# Patient Record
Sex: FEMALE | Race: BLACK OR AFRICAN AMERICAN | Employment: FULL TIME | URBAN - METROPOLITAN AREA
[De-identification: names, ages, dates, MRNs, and addresses within clinical notes are randomized per-mention and may not be internally consistent; named-entity substitution may affect disease eponyms.]

---

## 2017-03-15 LAB
CHLAMYDIA, EXTERNAL: NEGATIVE
HBSAG, EXTERNAL: NEGATIVE
HIV, EXTERNAL: NEGATIVE
N. GONORRHEA, EXTERNAL: NEGATIVE
RPR, EXTERNAL: NON REACTIVE
RUBELLA, EXTERNAL: NORMAL
TYPE, ABO & RH, EXTERNAL: NORMAL

## 2017-09-01 LAB — GRBS, EXTERNAL: POSITIVE

## 2017-09-20 ENCOUNTER — HOSPITAL ENCOUNTER (INPATIENT)
Age: 27
LOS: 2 days | Discharge: HOME | DRG: 560 | End: 2017-09-23
Attending: OBSTETRICS & GYNECOLOGY | Admitting: OBSTETRICS & GYNECOLOGY
Payer: MEDICAID

## 2017-09-20 PROCEDURE — 4A1H74Z MONITORING OF PRODUCTS OF CONCEPTION, CARDIAC ELECTRICAL ACTIVITY, VIA NATURAL OR ARTIFICIAL OPENING: ICD-10-PCS | Performed by: OBSTETRICS & GYNECOLOGY

## 2017-09-20 NOTE — IP AVS SNAPSHOT
[address] [address] 
[phone] Patient: Vern Chaudhari MRN: [id number] IHX:8/3/7302 Current Discharge Medication List  
  
START taking these medications Dose & Instructions Dispensing Information Comments Morning Noon Evening Bedtime  
 ibuprofen 800 mg tablet Commonly known as:  MOTRIN Your last dose was: Your next dose is:    
   
   
 Dose:  800 mg Take 1 Tab by mouth every eight (8) hours as needed. Quantity:  60 Tab Refills:  1  
     
   
   
   
  
 oxyCODONE-acetaminophen 5-325 mg per tablet Commonly known as:  PERCOCET Your last dose was: Your next dose is:    
   
   
 Dose:  2 Tab Take 2 Tabs by mouth every four (4) hours as needed. Max Daily Amount: 12 Tabs. Quantity:  30 Tab Refills:  0 ASK your doctor about these medications Dose & Instructions Dispensing Information Comments Morning Noon Evening Bedtime PRENATAL DHA+COMPLETE PRENATAL -300 mg-mcg-mg Cmpk Generic drug:  [id number] loyd-folic-dha Your last dose was: Your next dose is:    
   
   
 Dose:  1 Tab Take 1 Tab by mouth daily. Refills:  0 Where to Get Your Medications Information on where to get these meds will be given to you by the nurse or doctor. ! Ask your nurse or doctor about these medications  
  ibuprofen 800 mg tablet  
 oxyCODONE-acetaminophen 5-325 mg per tablet

## 2017-09-20 NOTE — IP AVS SNAPSHOT
Sarah Garcia 
 
 
 [address] [facility] [address] 
[phone] Patient: Vern Chaudhari MRN: [id number] PPE:3/8/5636 You are allergic to the following No active allergies Recent Documentation Height Weight Breastfeeding? BMI OB Status Smoking Status 1.6 m 112.9 kg Yes 44.11 kg/m2 Recent pregnancy Never Smoker Emergency Contacts Name Discharge Info Relation Home Work Mobile [address] [facility] CAREGIVER [3] Parent [1]   [phone] About your hospitalization You were admitted on:  September 21, 2017 You last received care in the:  [address] [place] [address] You were discharged on:  September 23, 2017 Unit phone number:  [phone] Why you were hospitalized Your primary diagnosis was:  Not on File Your diagnoses also included:  Iup (Intrauterine Pregnancy), Incidental, 39 Weeks Gestation Of Pregnancy, Normal Spontaneous Vaginal Delivery, Laceration Of Vaginal Wall Or Sulcus Without Perineal Laceration During Delivery Providers Seen During Your Hospitalizations Provider Role Specialty Primary office phone Lisa Castillo MD Attending Provider Obstetrics & Gynecology [phone] Your Primary Care Physician (PCP) Primary Care Physician Office Phone Office Fax [facility], [address] [phone] Follow-up Information Follow up With Details Comments Contact Info Christianne Borja, [address] [place] [address] [facility] [address] 
[phone] Current Discharge Medication List  
  
START taking these medications Dose & Instructions Dispensing Information Comments Morning Noon Evening Bedtime  
 ibuprofen 800 mg tablet Commonly known as:  MOTRIN Your last dose was: Your next dose is:    
   
   
 Dose:  800 mg Take 1 Tab by mouth every eight (8) hours as needed. Quantity:  60 Tab Refills:  1  
     
   
   
   
  
 oxyCODONE-acetaminophen 5-325 mg per tablet Commonly known as:  PERCOCET Your last dose was: Your next dose is:    
   
   
 Dose:  2 Tab Take 2 Tabs by mouth every four (4) hours as needed. Max Daily Amount: 12 Tabs. Quantity:  30 Tab Refills:  0 ASK your doctor about these medications Dose & Instructions Dispensing Information Comments Morning Noon Evening Bedtime PRENATAL DHA+COMPLETE PRENATAL -300 mg-mcg-mg Cmpk Generic drug:  [id number] loyd-folic-dha Your last dose was: Your next dose is:    
   
   
 Dose:  1 Tab Take 1 Tab by mouth daily. Refills:  0 Where to Get Your Medications Information on where to get these meds will be given to you by the nurse or doctor. ! Ask your nurse or doctor about these medications  
  ibuprofen 800 mg tablet  
 oxyCODONE-acetaminophen 5-325 mg per tablet Discharge Instructions [employer] Activation Thank you for requesting access to [employer]. Please follow the instructions below to securely access and download your online medical record. [employer]Happy allows you to send messages to your doctor, view your test results, renew your prescriptions, schedule appointments, and more. How Do I Sign Up? 1. In your internet browser, go to [email or web address] Night 
2. Click on the First Time User? Click Here link in the Sign In box. You will be redirect to the New Member Sign Up page. 3. Enter your BuyHappy Access Code exactly as it appears below. You will not need to use this code after youve completed the sign-up process. If you do not sign up before the expiration date, you must request a new code. BuyHappy Access Code: [email or web address] Expires: 2017  6:03 PM (This is the date your BuyHappy access code will ) 4.  Enter the last four digits of your Social Security Number (xxxx) and Date of Birth (mm/dd/yyyy) as indicated and click Submit. You will be taken to the next sign-up page. 5. Create a Best Doctors ID. This will be your Best Doctors login ID and cannot be changed, so think of one that is secure and easy to remember. 6. Create a Best Doctors password. You can change your password at any time. 7. Enter your Password Reset Question and Answer. This can be used at a later time if you forget your password. 8. Enter your e-mail address. You will receive e-mail notification when new information is available in [address] 9. Click Sign Up. You can now view and download portions of your medical record. 10. Click the Download Summary menu link to download a portable copy of your medical information. Additional Information If you have questions, please visit the Frequently Asked Questions section of the Best Doctors website at [email or web address] Remember, Best Doctors is NOT to be used for urgent needs. For medical emergencies, dial 911. Patient armband removed and shredded Stroke treatment brochure was provided to: patient. Rationale for acute work-up of symptoms explained. Possible treatments, such as tPA or intervention for ischemic strokes and the need for a quick work-up, have been reviewed. Recognize signs and symptoms of STROKE: 
 
F-face looks uneven A-arms unable to move or move unevenly S-speech slurred or non-existent T-time-call 911 as soon as signs and symptoms begin-DO NOT go Back to bed or wait to see if you get better-TIME IS BRAIN. Signed By: Omayra Rosenbaum RN                                                                                                   Date: 9/23/2017 Time: 6:03 PM 
 
 
 
Discharge Orders None [facility] Announcement We are excited to announce that we are making your provider's discharge notes available to you in Best Doctors.   You will see these notes when they are completed and signed by the physician that discharged you from your recent hospital stay. If you have any questions or concerns about any information you see in Divided, please call the Health Information Department where you were seen or reach out to your Primary Care Provider for more information about your plan of care. Introducing [facility]! Meredith Carlos introduces Divided patient portal. Now you can access parts of your medical record, email your doctor's office, and request medication refills online. 1. In your internet browser, go to [email or web address] 2. Click on the First Time User? Click Here link in the Sign In box. You will see the New Member Sign Up page. 3. Enter your Divided Access Code exactly as it appears below. You will not need to use this code after youve completed the sign-up process. If you do not sign up before the expiration date, you must request a new code. · Divided Access Code: [email or web address] Expires: 12/22/2017  6:03 PM 
 
4. Enter the last four digits of your Social Security Number (xxxx) and Date of Birth (mm/dd/yyyy) as indicated and click Submit. You will be taken to the next sign-up page. 5. Create a Divided ID. This will be your Divided login ID and cannot be changed, so think of one that is secure and easy to remember. 6. Create a Divided password. You can change your password at any time. 7. Enter your Password Reset Question and Answer. This can be used at a later time if you forget your password. 8. Enter your e-mail address. You will receive e-mail notification when new information is available in [address] 9. Click Sign Up. You can now view and download portions of your medical record. 10. Click the Download Summary menu link to download a portable copy of your medical information.  
 
If you have questions, please visit the Frequently Asked Questions section of the [employer]. Remember, MyChart is NOT to be used for urgent needs. For medical emergencies, dial 911. Now available from your iPhone and Android! General Information Please provide this summary of care documentation to your next provider. Patient Signature:  ____________________________________________________________ Date:  ____________________________________________________________  
  
Sj Matsu Provider Signature:  ____________________________________________________________ Date:  ____________________________________________________________

## 2017-09-21 ENCOUNTER — ANESTHESIA EVENT (OUTPATIENT)
Dept: LABOR AND DELIVERY | Age: 27
DRG: 560 | End: 2017-09-21
Payer: MEDICAID

## 2017-09-21 ENCOUNTER — ANESTHESIA (OUTPATIENT)
Dept: LABOR AND DELIVERY | Age: 27
DRG: 560 | End: 2017-09-21
Payer: MEDICAID

## 2017-09-21 PROBLEM — Z33.1 IUP (INTRAUTERINE PREGNANCY), INCIDENTAL: Status: RESOLVED | Noted: 2017-09-21 | Resolved: 2017-09-21

## 2017-09-21 PROBLEM — Z33.1 IUP (INTRAUTERINE PREGNANCY), INCIDENTAL: Status: ACTIVE | Noted: 2017-09-21

## 2017-09-21 PROBLEM — Z3A.39 39 WEEKS GESTATION OF PREGNANCY: Status: ACTIVE | Noted: 2017-09-21

## 2017-09-21 PROBLEM — Z3A.39 39 WEEKS GESTATION OF PREGNANCY: Status: RESOLVED | Noted: 2017-09-21 | Resolved: 2017-09-21

## 2017-09-21 LAB
ABO + RH BLD: NORMAL
BASOPHILS # BLD: 0 K/UL (ref 0–0.06)
BASOPHILS NFR BLD: 0 % (ref 0–2)
BLOOD GROUP ANTIBODIES SERPL: NORMAL
DIFFERENTIAL METHOD BLD: NORMAL
EOSINOPHIL # BLD: 0.3 K/UL (ref 0–0.4)
EOSINOPHIL NFR BLD: 3 % (ref 0–5)
ERYTHROCYTE [DISTWIDTH] IN BLOOD BY AUTOMATED COUNT: 14.5 % (ref 11.6–14.5)
HCT VFR BLD AUTO: 37.4 % (ref 35–45)
HGB BLD-MCNC: 12.7 G/DL (ref 12–16)
LYMPHOCYTES # BLD: 1.8 K/UL (ref 0.9–3.6)
LYMPHOCYTES NFR BLD: 24 % (ref 21–52)
MCH RBC QN AUTO: 30.1 PG (ref 24–34)
MCHC RBC AUTO-ENTMCNC: 34 G/DL (ref 31–37)
MCV RBC AUTO: 88.6 FL (ref 74–97)
MONOCYTES # BLD: 0.4 K/UL (ref 0.05–1.2)
MONOCYTES NFR BLD: 6 % (ref 3–10)
NEUTS SEG # BLD: 5.2 K/UL (ref 1.8–8)
NEUTS SEG NFR BLD: 67 % (ref 40–73)
PLATELET # BLD AUTO: 219 K/UL (ref 135–420)
PMV BLD AUTO: 9.8 FL (ref 9.2–11.8)
RBC # BLD AUTO: 4.22 M/UL (ref 4.2–5.3)
SPECIMEN EXP DATE BLD: NORMAL
WBC # BLD AUTO: 7.7 K/UL (ref 4.6–13.2)

## 2017-09-21 PROCEDURE — 74011250637 HC RX REV CODE- 250/637: Performed by: OBSTETRICS & GYNECOLOGY

## 2017-09-21 PROCEDURE — 74011000258 HC RX REV CODE- 258: Performed by: OBSTETRICS & GYNECOLOGY

## 2017-09-21 PROCEDURE — 36415 COLL VENOUS BLD VENIPUNCTURE: CPT | Performed by: OBSTETRICS & GYNECOLOGY

## 2017-09-21 PROCEDURE — 77030034849

## 2017-09-21 PROCEDURE — 74011250636 HC RX REV CODE- 250/636

## 2017-09-21 PROCEDURE — 74011250636 HC RX REV CODE- 250/636: Performed by: ANESTHESIOLOGY

## 2017-09-21 PROCEDURE — 74011000250 HC RX REV CODE- 250

## 2017-09-21 PROCEDURE — 74011250636 HC RX REV CODE- 250/636: Performed by: OBSTETRICS & GYNECOLOGY

## 2017-09-21 PROCEDURE — 0KQM0ZZ REPAIR PERINEUM MUSCLE, OPEN APPROACH: ICD-10-PCS | Performed by: OBSTETRICS & GYNECOLOGY

## 2017-09-21 PROCEDURE — 75410000000 HC DELIVERY VAGINAL/SINGLE

## 2017-09-21 PROCEDURE — 75410000002 HC LABOR FEE PER 1 HR

## 2017-09-21 PROCEDURE — 86900 BLOOD TYPING SEROLOGIC ABO: CPT | Performed by: OBSTETRICS & GYNECOLOGY

## 2017-09-21 PROCEDURE — 59025 FETAL NON-STRESS TEST: CPT

## 2017-09-21 PROCEDURE — 85025 COMPLETE CBC W/AUTO DIFF WBC: CPT | Performed by: OBSTETRICS & GYNECOLOGY

## 2017-09-21 PROCEDURE — 99282 EMERGENCY DEPT VISIT SF MDM: CPT

## 2017-09-21 PROCEDURE — 76060000078 HC EPIDURAL ANESTHESIA

## 2017-09-21 PROCEDURE — 65270000029 HC RM PRIVATE

## 2017-09-21 PROCEDURE — 77030007879 HC KT SPN EPDRL TELE -B: Performed by: ANESTHESIOLOGY

## 2017-09-21 RX ORDER — PHENYLEPHRINE HCL IN 0.9% NACL 0.4MG/10ML
80 SYRINGE (ML) INTRAVENOUS AS NEEDED
Status: DISCONTINUED | OUTPATIENT
Start: 2017-09-21 | End: 2017-09-22 | Stop reason: HOSPADM

## 2017-09-21 RX ORDER — FENTANYL CITRATE 50 UG/ML
100 INJECTION, SOLUTION INTRAMUSCULAR; INTRAVENOUS ONCE
Status: ACTIVE | OUTPATIENT
Start: 2017-09-21 | End: 2017-09-21

## 2017-09-21 RX ORDER — SODIUM CHLORIDE 0.9 % (FLUSH) 0.9 %
5-10 SYRINGE (ML) INJECTION EVERY 8 HOURS
Status: DISCONTINUED | OUTPATIENT
Start: 2017-09-21 | End: 2017-09-22 | Stop reason: HOSPADM

## 2017-09-21 RX ORDER — NALBUPHINE HYDROCHLORIDE 10 MG/ML
10 INJECTION, SOLUTION INTRAMUSCULAR; INTRAVENOUS; SUBCUTANEOUS
Status: DISCONTINUED | OUTPATIENT
Start: 2017-09-21 | End: 2017-09-22 | Stop reason: HOSPADM

## 2017-09-21 RX ORDER — LIDOCAINE HYDROCHLORIDE AND EPINEPHRINE 15; 5 MG/ML; UG/ML
INJECTION, SOLUTION EPIDURAL AS NEEDED
Status: DISCONTINUED | OUTPATIENT
Start: 2017-09-21 | End: 2017-09-21 | Stop reason: HOSPADM

## 2017-09-21 RX ORDER — OXYTOCIN/RINGER'S LACTATE 20/1000 ML
125 PLASTIC BAG, INJECTION (ML) INTRAVENOUS CONTINUOUS
Status: DISCONTINUED | OUTPATIENT
Start: 2017-09-21 | End: 2017-09-22 | Stop reason: HOSPADM

## 2017-09-21 RX ORDER — BUTORPHANOL TARTRATE 2 MG/ML
2 INJECTION INTRAMUSCULAR; INTRAVENOUS
Status: DISCONTINUED | OUTPATIENT
Start: 2017-09-21 | End: 2017-09-22 | Stop reason: HOSPADM

## 2017-09-21 RX ORDER — HYDROMORPHONE HYDROCHLORIDE 2 MG/ML
1 INJECTION, SOLUTION INTRAMUSCULAR; INTRAVENOUS; SUBCUTANEOUS
Status: DISCONTINUED | OUTPATIENT
Start: 2017-09-21 | End: 2017-09-22 | Stop reason: HOSPADM

## 2017-09-21 RX ORDER — FENTANYL CITRATE 50 UG/ML
INJECTION, SOLUTION INTRAMUSCULAR; INTRAVENOUS
Status: DISPENSED
Start: 2017-09-21 | End: 2017-09-21

## 2017-09-21 RX ORDER — SODIUM CHLORIDE, SODIUM LACTATE, POTASSIUM CHLORIDE, CALCIUM CHLORIDE 600; 310; 30; 20 MG/100ML; MG/100ML; MG/100ML; MG/100ML
125 INJECTION, SOLUTION INTRAVENOUS CONTINUOUS
Status: DISCONTINUED | OUTPATIENT
Start: 2017-09-21 | End: 2017-09-22 | Stop reason: HOSPADM

## 2017-09-21 RX ORDER — FENTANYL/ROPIVACAINE/NS/PF 2MCG/ML-.1
PLASTIC BAG, INJECTION (ML) EPIDURAL
Status: COMPLETED
Start: 2017-09-21 | End: 2017-09-21

## 2017-09-21 RX ORDER — ONDANSETRON 2 MG/ML
4 INJECTION INTRAMUSCULAR; INTRAVENOUS
Status: DISCONTINUED | OUTPATIENT
Start: 2017-09-21 | End: 2017-09-22 | Stop reason: HOSPADM

## 2017-09-21 RX ORDER — LIDOCAINE HYDROCHLORIDE 10 MG/ML
20 INJECTION, SOLUTION EPIDURAL; INFILTRATION; INTRACAUDAL; PERINEURAL AS NEEDED
Status: DISCONTINUED | OUTPATIENT
Start: 2017-09-21 | End: 2017-09-22 | Stop reason: HOSPADM

## 2017-09-21 RX ORDER — OXYTOCIN IN 5 % DEXTROSE 30/500 ML
.5-2 PLASTIC BAG, INJECTION (ML) INTRAVENOUS
Status: DISCONTINUED | OUTPATIENT
Start: 2017-09-21 | End: 2017-09-23 | Stop reason: HOSPADM

## 2017-09-21 RX ORDER — FENTANYL CITRATE 50 UG/ML
INJECTION, SOLUTION INTRAMUSCULAR; INTRAVENOUS AS NEEDED
Status: DISCONTINUED | OUTPATIENT
Start: 2017-09-21 | End: 2017-09-21 | Stop reason: HOSPADM

## 2017-09-21 RX ORDER — TERBUTALINE SULFATE 1 MG/ML
0.25 INJECTION SUBCUTANEOUS
Status: DISCONTINUED | OUTPATIENT
Start: 2017-09-21 | End: 2017-09-22 | Stop reason: HOSPADM

## 2017-09-21 RX ORDER — NALOXONE HYDROCHLORIDE 0.4 MG/ML
0.2 INJECTION, SOLUTION INTRAMUSCULAR; INTRAVENOUS; SUBCUTANEOUS AS NEEDED
Status: DISCONTINUED | OUTPATIENT
Start: 2017-09-21 | End: 2017-09-22 | Stop reason: HOSPADM

## 2017-09-21 RX ORDER — OXYTOCIN/RINGER'S LACTATE 20/1000 ML
500 PLASTIC BAG, INJECTION (ML) INTRAVENOUS ONCE
Status: DISPENSED | OUTPATIENT
Start: 2017-09-21 | End: 2017-09-21

## 2017-09-21 RX ORDER — MINERAL OIL
30 OIL (ML) ORAL AS NEEDED
Status: COMPLETED | OUTPATIENT
Start: 2017-09-21 | End: 2017-09-21

## 2017-09-21 RX ORDER — OXYTOCIN IN 5 % DEXTROSE 30/500 ML
PLASTIC BAG, INJECTION (ML) INTRAVENOUS
Status: COMPLETED
Start: 2017-09-21 | End: 2017-09-21

## 2017-09-21 RX ORDER — LIDOCAINE HYDROCHLORIDE 10 MG/ML
INJECTION INFILTRATION; PERINEURAL AS NEEDED
Status: DISCONTINUED | OUTPATIENT
Start: 2017-09-21 | End: 2017-09-21 | Stop reason: HOSPADM

## 2017-09-21 RX ORDER — FENTANYL/ROPIVACAINE/NS/PF 2MCG/ML-.1
12 PLASTIC BAG, INJECTION (ML) EPIDURAL
Status: DISPENSED | OUTPATIENT
Start: 2017-09-21 | End: 2017-09-22

## 2017-09-21 RX ORDER — DIPHENHYDRAMINE HYDROCHLORIDE 50 MG/ML
12.5 INJECTION, SOLUTION INTRAMUSCULAR; INTRAVENOUS
Status: DISCONTINUED | OUTPATIENT
Start: 2017-09-21 | End: 2017-09-22 | Stop reason: HOSPADM

## 2017-09-21 RX ORDER — SODIUM CHLORIDE 0.9 % (FLUSH) 0.9 %
5-10 SYRINGE (ML) INJECTION AS NEEDED
Status: DISCONTINUED | OUTPATIENT
Start: 2017-09-21 | End: 2017-09-22 | Stop reason: HOSPADM

## 2017-09-21 RX ORDER — METHYLERGONOVINE MALEATE 0.2 MG/ML
0.2 INJECTION INTRAVENOUS AS NEEDED
Status: DISCONTINUED | OUTPATIENT
Start: 2017-09-21 | End: 2017-09-22 | Stop reason: HOSPADM

## 2017-09-21 RX ORDER — ACETAMINOPHEN 500 MG
1000 TABLET ORAL ONCE
Status: COMPLETED | OUTPATIENT
Start: 2017-09-21 | End: 2017-09-21

## 2017-09-21 RX ADMIN — Medication 14 MILLI-UNITS/MIN: at 13:08

## 2017-09-21 RX ADMIN — SODIUM CHLORIDE, SODIUM LACTATE, POTASSIUM CHLORIDE, AND CALCIUM CHLORIDE 1000 ML: 600; 310; 30; 20 INJECTION, SOLUTION INTRAVENOUS at 04:15

## 2017-09-21 RX ADMIN — Medication 16 MILLI-UNITS/MIN: at 14:12

## 2017-09-21 RX ADMIN — PENICILLIN G POTASSIUM 2.5 MILLION UNITS: 20000000 POWDER, FOR SOLUTION INTRAVENOUS at 06:32

## 2017-09-21 RX ADMIN — LIDOCAINE HYDROCHLORIDE 3 ML: 10 INJECTION INFILTRATION; PERINEURAL at 04:46

## 2017-09-21 RX ADMIN — Medication 12 ML/HR: at 05:12

## 2017-09-21 RX ADMIN — LIDOCAINE HYDROCHLORIDE AND EPINEPHRINE 3 ML: 15; 5 INJECTION, SOLUTION EPIDURAL at 04:56

## 2017-09-21 RX ADMIN — Medication 30 ML: at 20:45

## 2017-09-21 RX ADMIN — Medication 2 MILLI-UNITS/MIN: at 09:02

## 2017-09-21 RX ADMIN — Medication 12 ML/HR: at 12:38

## 2017-09-21 RX ADMIN — LIDOCAINE HYDROCHLORIDE 3 ML: 10 INJECTION INFILTRATION; PERINEURAL at 04:51

## 2017-09-21 RX ADMIN — SODIUM CHLORIDE 5 MILLION UNITS: 900 INJECTION INTRAVENOUS at 01:20

## 2017-09-21 RX ADMIN — PENICILLIN G POTASSIUM 2.5 MILLION UNITS: 20000000 POWDER, FOR SOLUTION INTRAVENOUS at 18:49

## 2017-09-21 RX ADMIN — BUTORPHANOL TARTRATE 2 MG: 2 INJECTION, SOLUTION INTRAMUSCULAR; INTRAVENOUS at 02:08

## 2017-09-21 RX ADMIN — SODIUM CHLORIDE, SODIUM LACTATE, POTASSIUM CHLORIDE, AND CALCIUM CHLORIDE 125 ML/HR: 600; 310; 30; 20 INJECTION, SOLUTION INTRAVENOUS at 04:39

## 2017-09-21 RX ADMIN — Medication 12 ML/HR: at 20:48

## 2017-09-21 RX ADMIN — SODIUM CHLORIDE, SODIUM LACTATE, POTASSIUM CHLORIDE, AND CALCIUM CHLORIDE 125 ML/HR: 600; 310; 30; 20 INJECTION, SOLUTION INTRAVENOUS at 13:59

## 2017-09-21 RX ADMIN — ACETAMINOPHEN 1000 MG: 500 TABLET ORAL at 13:35

## 2017-09-21 RX ADMIN — PENICILLIN G POTASSIUM 2.5 MILLION UNITS: 20000000 POWDER, FOR SOLUTION INTRAVENOUS at 14:42

## 2017-09-21 RX ADMIN — FENTANYL CITRATE 100 MCG: 50 INJECTION, SOLUTION INTRAMUSCULAR; INTRAVENOUS at 04:57

## 2017-09-21 RX ADMIN — PENICILLIN G POTASSIUM 2.5 MILLION UNITS: 20000000 POWDER, FOR SOLUTION INTRAVENOUS at 10:52

## 2017-09-21 RX ADMIN — SODIUM CHLORIDE, SODIUM LACTATE, POTASSIUM CHLORIDE, AND CALCIUM CHLORIDE 300 ML: 600; 310; 30; 20 INJECTION, SOLUTION INTRAVENOUS at 13:09

## 2017-09-21 RX ADMIN — SODIUM CHLORIDE, SODIUM LACTATE, POTASSIUM CHLORIDE, AND CALCIUM CHLORIDE 300 ML: 600; 310; 30; 20 INJECTION, SOLUTION INTRAVENOUS at 09:47

## 2017-09-21 NOTE — PROGRESS NOTES
1830; Dr Ansley Salgado in to assess pt; small rim of cx still present; pt readjusted to high fowlers

## 2017-09-21 NOTE — PROGRESS NOTES
S. Great pain relief w/ epidural  O. VSS. FHR category 1       Pit augment in progres. A. Active labor  P.  Anticipate

## 2017-09-21 NOTE — PROGRESS NOTES
2345 - Patient arrived to unit via wheelchair with c/o ROM at 2220. Patient states that she is having ctx. . GBS positive. 0000 - Assessment complete. VSS. EFM placed, clear lungs, trace PE, abdomen palpates soft.

## 2017-09-21 NOTE — ANESTHESIA PREPROCEDURE EVALUATION
Anesthetic History   No history of anesthetic complications            Review of Systems / Medical History  Patient summary reviewed, nursing notes reviewed and pertinent labs reviewed    Pulmonary  Within defined limits            Pertinent negatives: No smoker     Neuro/Psych   Within defined limits           Cardiovascular                Pertinent negatives: No hypertension  Exercise tolerance: >4 METS     GI/Hepatic/Renal                Endo/Other          Pertinent negatives: No diabetes   Other Findings              Physical Exam    Airway  Mallampati: III  TM Distance: 4 - 6 cm  Neck ROM: normal range of motion, short neck   Mouth opening: Normal     Cardiovascular    Rhythm: regular  Rate: normal         Dental  No notable dental hx       Pulmonary  Breath sounds clear to auscultation               Abdominal  GI exam deferred       Other Findings            Anesthetic Plan    ASA: 2, emergent  Anesthesia type: epidural            Anesthetic plan and risks discussed with: Patient      Plan labor epidural.  Pt aware of risks including rare risk of nerve damage and elects to proceed.

## 2017-09-21 NOTE — PROGRESS NOTES
Bedside and verbal report received from Santos Jean Charge RN via SBAR, kardex, and MAR. Assumed care of pt at this time. Pt resting in bed comfortably with family at bedside. POC reviewed with pt. Pt verbalized understanding. No needs expressed at this time. Will continue to monitor pt.     0149: Pt taken off EFM monitor. Pt transferred to  6    0200: EFM monitor reapplied    0205: SVE: 4/90/-2    0208: 2 mg stadol given for 10/10 ctx pain    0210: Head to toe assessment performed. 8492: Dr. Aaliyah Mujica at bedside to assess pt.    0157: Pt requesting epidural. LR bolus started    0419: Dr. Kobi Barraza paged through [address]: Dr. Kobi Barraza at bedside for epidural    0430: Pt sitting up for epidural    0435: Time out performed. 8747: Epidural catheter placed    0456: Test dose administered. BP cycling every 2 minutes    0457: Loading dose administered. Fentanyl given to Dr. Kobi Barraza    0501: Bolus given by Dr. Kobi Barraza    0502: Epidural pump connected by Dr. Kobi Barraza. 0503: Pt laying back down in semifowlers position. 9618: Morrison placed    0510: SVE: 4-5/90/-2    3042: 2.5 Million Units of PCN G hung IVPB for positive GBS    0704: AMERICO Wheatley Dear called unit for update on pt. Informed CNM that pt is comfortable with an epidural and last cervical exam was 4-5/90/-2. Strip is reactive and reassuring. MANDA Horn stated to start Pitocin    0715: Bedside and verbal report given to TOD Virgen RN and Krishna Dos Santos, RN via SBAR, Kardex, and [facility]  Care relinquished at this time

## 2017-09-21 NOTE — H&P
Ostetrical History and Physical    Subjective:     Date of Admission: 2017    Patient is a 28 y.o.  female admitted with srom 1030pm now with ctx in labor . gbs pos. For Obstetric history, see prenantal.    No past medical history on file. No past surgical history on file. Prior to Admission medications    Medication Sig Start Date End Date Taking? Authorizing Provider   [id number] loyd-folic-dha (PRENATAL DHA+COMPLETE PRENATAL) [id number] mg-mcg-mg cmpk Take 1 Tab by mouth daily. Yes Historical Provider     No Known Allergies   Social History   Substance Use Topics    Smoking status: Never Smoker    Smokeless tobacco: Never Used    Alcohol use No      No family history on file. Review of Systems    Objective:     Blood pressure 123/74, pulse 84, temperature 99 °F (37.2 °C), resp. rate 18, height 5' 3\" (1.6 m), weight 112.9 kg (249 lb), currently breastfeeding. Temp (24hrs), Av °F (37.2 °C), Min:99 °F (37.2 °C), Max:99 °F (37.2 °C)                @BSHSIPHYSEXAM    Pelvic: Cervix4, Effaced:> 50%Station:-2 per rn srom clear fluid  Data Review:   Recent Results (from the past 24 hour(s))   CBC WITH AUTOMATED DIFF    Collection Time: 17  1:10 AM   Result Value Ref Range    WBC 7.7 4.6 - 13.2 K/uL    RBC 4.22 4.20 - 5.30 M/uL    HGB 12.7 12.0 - 16.0 g/dL    HCT 37.4 35.0 - 45.0 %    MCV 88.6 74.0 - 97.0 FL    MCH 30.1 24.0 - 34.0 PG    MCHC 34.0 31.0 - 37.0 g/dL    RDW 14.5 11.6 - 14.5 %    PLATELET 052 378 - 993 K/uL    MPV 9.8 9.2 - 11.8 FL    NEUTROPHILS 67 40 - 73 %    LYMPHOCYTES 24 21 - 52 %    MONOCYTES 6 3 - 10 %    EOSINOPHILS 3 0 - 5 %    BASOPHILS 0 0 - 2 %    ABS. NEUTROPHILS 5.2 1.8 - 8.0 K/UL    ABS. LYMPHOCYTES 1.8 0.9 - 3.6 K/UL    ABS. MONOCYTES 0.4 0.05 - 1.2 K/UL    ABS. EOSINOPHILS 0.3 0.0 - 0.4 K/UL    ABS.  BASOPHILS 0.0 0.0 - 0.06 K/UL    DF AUTOMATED     TYPE & SCREEN    Collection Time: 17  1:10 AM   Result Value Ref Range    Crossmatch Expiration 09/24/2017     ABO/Rh(D) A POSITIVE     Antibody screen NEG      Monitor:  Reactivity:present Variability:present Baseline:within normal limits    Assessment:     Active Problems:    IUP (intrauterine pregnancy), incidental (9/21/2017)      39 weeks gestation of pregnancy (9/21/2017)        Plan:labor anticipate vaginal delivery     Prophylaxis:  Deep Vein Thrombosis Protocol Active:Yes    Check labs:    Check  Prenatal:    Disposition    Total time spent with patient:In-Patient    Signed By: Fred Abdi MD                         September 21, 2017

## 2017-09-21 NOTE — ANESTHESIA PROCEDURE NOTES
Epidural Block    Start time: 9/21/2017 4:20 AM  End time: 9/21/2017 5:05 AM  Performed by: Dana Porter  Authorized by: Dana Porter     Pre-Procedure  Indication: labor epidural    Preanesthetic Checklist: patient identified, risks and benefits discussed, anesthesia consent, site marked, patient being monitored, timeout performed and anesthesia consent    Timeout Time: 04:35        Epidural:   Patient position:  Seated  Prep region:  Lumbar  Prep: Chlorhexidine    Location:  L3-4    Needle and Epidural Catheter:   Needle Type:  Tuohy  Needle Gauge:  17 G  Injection Technique:  Loss of resistance using saline  Attempts:  1  Catheter Size:  19 G  Catheter at Skin Depth (cm):  13  Depth in Epidural Space (cm):  5  Events: no blood with aspiration, no cerebrospinal fluid with aspiration, no paresthesia and negative aspiration test    Test Dose:  Negative and lidocaine 1.5% w/ epi    Assessment:   Catheter Secured:  Tegaderm and tape  Insertion:  Uncomplicated  Patient tolerance:  Patient tolerated the procedure well with no immediate complications  Difficult placement due to body habitus. 3 passes at L4/5 then 3 passes at L3/4 prior to locating space. No pain or paresthesias noted throughout procedure.

## 2017-09-21 NOTE — PROGRESS NOTES
12 SBAR received from Yessi Farrar RN. Patient resting comfortably in bed with family at bedside. Head to toe assessment complete as doc in flowsheets. Denies needs. 6040 Patient positioned with right pelvic hip tilt. Denies needs. 6072 Patient positioned with left pelvic hip tilt. Ice chips given per request.    0236 M. Adrián Costa at bedside. SVE 4-5/100/-2. Patient resting comfortably; denies needs. 1310 Patient states feels rectal pressure. SVE 7-8/100/-1  Positioned patient high marshall's with heels together. 1320 Oral temp 100.8; MD paged. 1330 Return call; updated on last SVE; GBS status/prophylaxis; fever; Orders initiated for 1000 mg Tylenol po.  1341 SVE 7/100/-1; Jennifer Lu, RN  1420 Patient turned right lateral with peanut ball. Denies needs. Oral temp 99.4 as doc in flowsheets. 32 61 16 Patient turned left lateral with peanut ball. New bed pads; denies needs. 1545 SVE 9 w/ rim on right side/100/-1; repositioned right lateral with peanut ball. [address] Adrián Costa called; she is not on call for Dr. Ravinder Koroma; Dr. Gema Gamble is covering. 0 Dr. Gema Gamble paged. 1915 Bedside and Verbal shift change report given to BETY Garcia RN (oncoming nurse) by Shanae Griffin RN (offgoing nurse). Report included the following information SBAR, Kardex, Intake/Output, MAR and Recent Results.

## 2017-09-22 LAB
HCT VFR BLD AUTO: 32.8 % (ref 35–45)
HGB BLD-MCNC: 11.2 G/DL (ref 12–16)

## 2017-09-22 PROCEDURE — 65270000029 HC RM PRIVATE

## 2017-09-22 PROCEDURE — 74011250637 HC RX REV CODE- 250/637: Performed by: OBSTETRICS & GYNECOLOGY

## 2017-09-22 PROCEDURE — 85014 HEMATOCRIT: CPT | Performed by: OBSTETRICS & GYNECOLOGY

## 2017-09-22 PROCEDURE — 36415 COLL VENOUS BLD VENIPUNCTURE: CPT | Performed by: OBSTETRICS & GYNECOLOGY

## 2017-09-22 PROCEDURE — 85018 HEMOGLOBIN: CPT | Performed by: OBSTETRICS & GYNECOLOGY

## 2017-09-22 PROCEDURE — 75410000003 HC RECOV DEL/VAG/CSECN EA 0.5 HR

## 2017-09-22 RX ORDER — ZOLPIDEM TARTRATE 5 MG/1
5 TABLET ORAL
Status: DISCONTINUED | OUTPATIENT
Start: 2017-09-22 | End: 2017-09-23 | Stop reason: HOSPADM

## 2017-09-22 RX ORDER — ACETAMINOPHEN 325 MG/1
650 TABLET ORAL
Status: DISCONTINUED | OUTPATIENT
Start: 2017-09-22 | End: 2017-09-23 | Stop reason: HOSPADM

## 2017-09-22 RX ORDER — OXYCODONE AND ACETAMINOPHEN 5; 325 MG/1; MG/1
2 TABLET ORAL
Status: DISCONTINUED | OUTPATIENT
Start: 2017-09-22 | End: 2017-09-23 | Stop reason: HOSPADM

## 2017-09-22 RX ORDER — AMOXICILLIN 250 MG
1 CAPSULE ORAL
Status: DISCONTINUED | OUTPATIENT
Start: 2017-09-22 | End: 2017-09-23 | Stop reason: HOSPADM

## 2017-09-22 RX ORDER — IBUPROFEN 400 MG/1
800 TABLET ORAL
Status: DISCONTINUED | OUTPATIENT
Start: 2017-09-22 | End: 2017-09-23 | Stop reason: HOSPADM

## 2017-09-22 RX ORDER — PROMETHAZINE HYDROCHLORIDE 25 MG/ML
25 INJECTION, SOLUTION INTRAMUSCULAR; INTRAVENOUS
Status: DISCONTINUED | OUTPATIENT
Start: 2017-09-22 | End: 2017-09-23 | Stop reason: HOSPADM

## 2017-09-22 RX ADMIN — IBUPROFEN 800 MG: 400 TABLET, FILM COATED ORAL at 09:09

## 2017-09-22 RX ADMIN — IBUPROFEN 800 MG: 400 TABLET, FILM COATED ORAL at 00:56

## 2017-09-22 NOTE — PROGRESS NOTES
Patient was visited by [facility] volunteer Ridge Cruz. Volunteer conducted a Spiritual Care Screening and reported no needs to this . Baby Fulda Card and Spiritual Care literature were provided. Chaplains will continue to follow and will provide pastoral care as needed or requested. [address], M.Div.   Board Certified   [phone] - Office

## 2017-09-22 NOTE — ROUTINE PROCESS
TRANSFER - IN REPORT:    Verbal report received from Antonio Abdalla RN (name) on Radha Perez  being received from L and D (unit) for routine progression of care      Report consisted of patients Situation, Background, Assessment and   Recommendations(SBAR). Information from the following report(s) SBAR, Intake/Output, MAR and Recent Results was reviewed with the receiving nurse. Opportunity for questions and clarification was provided. Assessment completed upon patients arrival to unit and care assumed. VSS. Assessment completed. Oriented to room and unit. Pt up voiding on own. Denies c/o at this time.

## 2017-09-22 NOTE — PROGRESS NOTES
1920 recvd report from Leonard Lange at bedside  1930 cervix still at 9.5, will start pushing, Dr Saranya Langford at bedside  1945 cervix complete, pt pushing, epidural turned off per Dr Aleksandra Cruz order  2030 pt states she is tired and does not want to push, epidural turned back on, spoke with Dr Saranya Langford here on unit, ok for pt to rest  2110 pt vomiting  2140 Dr Saranya Langford at bedside, pt starting to push again  2259 pt delivered viable baby girl, cord clamped and cut and handed to odette at bedside  0030 pt up to br, voided, edi care given and pad changed  0430 pt resting, vitals taken, pt has no c/o or needs  0710 report given to Nikos Cooper

## 2017-09-22 NOTE — PROGRESS NOTES
Post-Partum Day Number 1  Progress Note    Patient doing well post-partum without significant complaints. Voiding without difficulty, normal lochia. Breast feeding well. Emotionally stable. Breastfeeding: Infant Feeding: Breastmilk  Vitals:  Patient Vitals for the past 8 hrs:   BP Temp Pulse Resp   17 0433 108/52 98.5 °F (36.9 °C) 80 18     Temp (24hrs), Av.4 °F (37.4 °C), Min:98.3 °F (36.8 °C), Max:100.8 °F (38.2 °C)      Vital signs stable, afebrile. Exam:  Patient is in good general condition. Emotionally: appears to be stable  Fundus:  Firm and not tender. Lower extremities are negative for swelling, cords or tenderness. Lab/Data Review:  CBC: Lab Results   Component Value Date/Time    WBC 7.7 2017 01:10 AM    RBC 4.22 2017 01:10 AM    HGB 11.2 2017 04:55 AM    HCT 32.8 2017 04:55 AM    PLATELET 917  01:10 AM     Lab results reviewed. For significant abnormal values and values requiring intervention, see assessment and plan. Assessment:Post Partum Day number 1 PT doing well. Plan:    Continue routine perineal care and maternal education. Plan discharge tomorrow if no problems occur.     Education:  Post partum instructions discussed                Charli Ragland MD  2017

## 2017-09-22 NOTE — ROUTINE PROCESS
Bedside and Verbal shift change report given to Teo Shaw rN  (oncoming nurse) by TIFFANIE Jones LPN (offgoing nurse). Report given with SBAR, Kardex, Intake/Output, MAR and Recent Results.

## 2017-09-22 NOTE — LACTATION NOTE
Infant still not latching with nipple shield. 1649 blood sugar was 44. Mom has her pump-to double pump every 2-3 hours. Few drops colostrum hand expressed and spoon fed. Staff aware of probable 2000 need of blood sugar.

## 2017-09-22 NOTE — L&D DELIVERY NOTE
Delivery Summary    Patient: Sandrine Virk MRN: [id number]  SSN: [id number]    YOB: 1990  Age: 28 y.o. Sex: female        Labor Events:    Labor: No    Rupture Date: 2017    Rupture Time: 10:15 PM    Rupture Type SROM    Amniotic Fluid Volume:      Amniotic Fluid Description:    None    Induction: None        Augmentation: None    Labor Complications: None     Additional Complications:        Cervical Ripening:       None      Delivery Events:  Episiotomy: None    Laceration(s): Vaginal      Repaired: Yes     Number of Repair Packets: 1    Suture Type and Size:         Estimated Blood Loss (ml): 300        Information for the patient's :  Avis Dempsey [[id number]]     Delivery Summary - Baby    Delivery Date: 2017   Delivery Time: 10:59 PM   Delivery Type: Vaginal, Spontaneous Delivery  Sex:  female  Gestational Age: 39w5d  Delivery Clinician:  Brittaney Archer  Living?: Living   Delivery Location: L&D             APGARS  One minute Five minutes Ten minutes   Skin Color: 1    1       Heart Rate: 2   2         Reflex Irritability: 2   2         Muscle Tone: 2   2       Respiration: 2   2         Total: 9   9           Presentation: Vertex  Position: Left Occiput Anterior  Resuscitation Method:  Suctioning-bulb; Tactile Stimulation     Meconium Stained: None    Cord Information: 3 Vessels   Complications: None  Cord Blood Sent?:  No    Blood Gases Sent?:  No    Placenta:  Date/Time:  11:16 PM  Removal: Spontaneous      Appearance: Normal     [place] Measurements:  Birth Weight: 6 lb 13.7 oz (3.11 kg)    Birth Length: 1' 8.87\" (0.53 m)   Head Circumference: 1' 1.78\" (0.35 m)     Chest Circumference: 1' 0.2\" (0.31 m)    Abdominal Girth: 11.42\" (0.29 m)    Other Providers:   JERE Joshua;DANIEL BAE;SONNY ULLOA;SISSY VIERA;ALONA DARLING Obstetrician;Primary Nurse;Primary  Nurse;Scrub Tech;Neonatologist;Anesthesiologist;Crna;Nurse Practitioner;Midwife;Nursery Nurse           Cord Blood Results:  Information for the patient's :  Baudilio Espana [[id number]]   No results found for: Charma Ape, PCTDIG, BILI, ABORHEXT, [address]    Information for the patient's :  Baudilio Espana [[id number]]   No results found for: APH, APCO2, APO2, AHCO3, ABEC, ABDC, O2ST, SITE, RSCOM, PHI, Slater, PO2I, HCO3I, SO2I, IBD     Information for the patient's :  Baudilio Espana [[id number]]   No results found for: EPHV, PCO2V, PO2V, HCO3V, O2STV, EBDV

## 2017-09-22 NOTE — PROGRESS NOTES
0710 Bedside and Verbal shift change report received from Reggie Cook RN. Report included the following information SBAR, Kardex, Intake/Output and MAR.     0909 Pt resting in semifowlers. Reports pain of 4/10 Motrin given per MAR. Denies any other needs. 1145 Pt resting in chair with baby in room. Denies needs at this time. 1500 Pt up to shower independently. 1720 TRANSFER - OUT REPORT:    Bedside report given to TIFFANIE Jones LPN(name) on Radha Perez  being transferred to postpartum (unit) for routine progression of care       Report consisted of patients Situation, Background, Assessment and   Recommendations(SBAR). Information from the following report(s) SBAR, Kardex, Intake/Output and MAR was reviewed with the receiving nurse. Lines:   Peripheral IV 09/21/17 Right Hand (Active)   Site Assessment Clean, dry, & intact 9/22/2017  7:15 AM   Phlebitis Assessment 0 9/22/2017  7:15 AM   Infiltration Assessment 0 9/22/2017  7:15 AM   Dressing Status Clean, dry, & intact 9/22/2017  7:15 AM   Dressing Type Tape;Transparent 9/22/2017  7:15 AM   Hub Color/Line Status Green; Infusing;Patent 9/21/2017  7:30 AM   Action Taken Open ports on tubing capped 9/21/2017  7:30 AM   Alcohol Cap Used Yes 9/21/2017  7:30 AM        Opportunity for questions and clarification was provided.       Patient transported with:   Registered Nurse

## 2017-09-23 VITALS
DIASTOLIC BLOOD PRESSURE: 61 MMHG | HEIGHT: 63 IN | TEMPERATURE: 97.4 F | RESPIRATION RATE: 18 BRPM | WEIGHT: 249 LBS | OXYGEN SATURATION: 99 % | HEART RATE: 88 BPM | SYSTOLIC BLOOD PRESSURE: 104 MMHG | BODY MASS INDEX: 44.12 KG/M2

## 2017-09-23 PROCEDURE — 74011250637 HC RX REV CODE- 250/637: Performed by: OBSTETRICS & GYNECOLOGY

## 2017-09-23 RX ORDER — OXYCODONE AND ACETAMINOPHEN 5; 325 MG/1; MG/1
2 TABLET ORAL
Qty: 30 TAB | Refills: 0 | Status: SHIPPED | OUTPATIENT
Start: 2017-09-23 | End: 2021-10-30

## 2017-09-23 RX ORDER — IBUPROFEN 800 MG/1
800 TABLET ORAL
Qty: 60 TAB | Refills: 1 | Status: SHIPPED | OUTPATIENT
Start: 2017-09-23 | End: 2021-10-30

## 2017-09-23 RX ADMIN — ACETAMINOPHEN 650 MG: 325 TABLET ORAL at 17:01

## 2017-09-23 NOTE — PROGRESS NOTES
Nadira Daily BEDSIDE_VERBAL_RECORDED_WRITTEN: shift change report given to BRYCE mattson rn (oncoming nurse) by caity Hanson (offgoing nurse). Report given with Marisa NAPOLES and MAR.

## 2017-09-23 NOTE — PROGRESS NOTES
Received care of mother in room, no distress, call bell in reach, encouraged ambulation in hallways this pm, family@ bedside

## 2017-09-23 NOTE — PROGRESS NOTES
Discharged patient per orders. Condition was stable during shift. Discharge information was reviewed; copies were given. Patient verbalized understanding. E-sign was completed. Hospital bands were removed.  No further needs expressed at this time

## 2017-09-23 NOTE — DISCHARGE SUMMARY
Obstetrical Discharge Summary     Name: Savanna Leung MRN: [id number]  SSN: [id number]    YOB: 1990  Age: 28 y.o. Sex: female      Admit Date: 2017    Discharge Date: 2017     Admitting Physician: Jessica Heard MD     Attending Physician:  Jessica Heard MD     Admission Diagnoses: labor  IUP (intrauterine pregnancy), incidental    Discharge Diagnoses:   Information for the patient's :  Maryanne Hanson [[id number]]   Delivery of a 3.11 kg female infant via Vaginal, Spontaneous Delivery on 2017 at 10:59 PM  by . Apgars were 9 and 9. Additional Diagnoses:   Hospital Problems  Date Reviewed: 2017          Codes Class Noted POA    Normal spontaneous vaginal delivery ICD-10-CM: O80  ICD-9-CM: 229  2017 No        Laceration of vaginal wall or sulcus without perineal laceration during delivery ICD-10-CM: O71.4  ICD-9-CM: 665.40  2017 No             Lab Results   Component Value Date/Time    Rubella, External IMMUNE 03/15/2017    GrBStrep, External POSITIVE 2017       Immunization(s): There is no immunization history for the selected administration types on file for this patient. Labs: No results found for this or any previous visit (from the past 24 hour(s)). Exam:  Chest is clear to auscultation. Fundus firm and nontender  Bowel sounds are normal  Legs are normal without tenderness, swelling, or redness    Hospital Course: Normal hospital course following the delivery. Patient Instructions:   Current Discharge Medication List      CONTINUE these medications which have NOT CHANGED    Details   [id number] loyd-folic-dha (PRENATAL DHA+COMPLETE PRENATAL) -300 mg-mcg-mg cmpk Take 1 Tab by mouth daily. Reference my discharge instructions. No orders of the defined types were placed in this encounter.        Signed By:  Charmayne Candy, MD     2017

## 2017-09-23 NOTE — ANESTHESIA POSTPROCEDURE EVALUATION
9/23/2017  1:57 PM    Laboring Epidural Follow-up Note     Referring physician: Fred Abdi MD   Patient status post vaginal delivery with labor epidural    Visit Vitals    /61 (BP 1 Location: Left arm, BP Patient Position: At rest)    Pulse 88    Temp 36.3 °C (97.4 °F)    Resp 18    Ht 5' 3\" (1.6 m)    Wt 112.9 kg (249 lb)    SpO2 99%    Breastfeeding Yes    BMI 44.11 kg/m2       Epidural removed by L&D staff  No sedation, pruritis noted. Adequate analgesia.   No obvious anesthesia complications          Nadeem Hull CRNA  Signed By: Nadeem Hull CRNA    September 23, 2017

## 2017-09-23 NOTE — LACTATION NOTE
Infant continues with poor feeding--mom has supplemented. Reviewed paced bottle feeding. Now, with nipple shield, infant does latch, takes few sucks. An improvement over 9/22. Mom has been pumping. Mom to double pump ac for few minutes, offer breast and then double pump pc for 10-15 min, until infant BFing consistently well. To follow supplement amounts at bottom of log sheet. Extended log sheets given. Encouraged to attend [facility] BF support group.

## 2017-09-23 NOTE — DISCHARGE INSTRUCTIONS
[employer] Activation    Thank you for requesting access to [employer]. Please follow the instructions below to securely access and download your online medical record. [employer] allows you to send messages to your doctor, view your test results, renew your prescriptions, schedule appointments, and more. How Do I Sign Up? 1. In your internet browser, go to [email or web address]  2. Click on the First Time User? Click Here link in the Sign In box. You will be redirect to the New Member Sign Up page. 3. Enter your [employer] Access Code exactly as it appears below. You will not need to use this code after youve completed the sign-up process. If you do not sign up before the expiration date, you must request a new code. [employer] Access Code: [email or web address]  Expires: 2017  6:03 PM (This is the date your [employer] access code will )    4. Enter the last four digits of your Social Security Number (xxxx) and Date of Birth (mm/dd/yyyy) as indicated and click Submit. You will be taken to the next sign-up page. 5. Create a [employer] ID. This will be your [employer] login ID and cannot be changed, so think of one that is secure and easy to remember. 6. Create a [employer] password. You can change your password at any time. 7. Enter your Password Reset Question and Answer. This can be used at a later time if you forget your password. 8. Enter your e-mail address. You will receive e-mail notification when new information is available in [address]. 9. Click Sign Up. You can now view and download portions of your medical record. 10. Click the Download Summary menu link to download a portable copy of your medical information. Additional Information    If you have questions, please visit the Frequently Asked Questions section of the [employer] website at [email or web address] Remember, Sense[employer] is NOT to be used for urgent needs. For medical emergencies, dial 911.       Patient armband removed and shredded  Stroke treatment brochure was provided to: patient. Rationale for acute work-up of symptoms explained. Possible treatments, such as tPA or intervention for ischemic strokes and the need for a quick work-up, have been reviewed. Recognize signs and symptoms of STROKE:    F-face looks uneven    A-arms unable to move or move unevenly    S-speech slurred or non-existent    T-time-call 911 as soon as signs and symptoms begin-DO NOT go       Back to bed or wait to see if you get better-TIME IS BRAIN.         Signed By: Lulu Marie RN                                                                                                   Date: 9/23/2017 Time: 6:03 PM

## 2021-06-02 ENCOUNTER — TRANSCRIBE ORDER (OUTPATIENT)
Dept: SCHEDULING | Age: 31
End: 2021-06-02

## 2021-06-02 DIAGNOSIS — T85.698A OTHER MECHANICAL COMPLICATION OF OTHER SPECIFIED INTERNAL PROSTHETIC DEVICES, IMPLANTS AND GRAFTS, INITIAL ENCOUNTER: Primary | ICD-10-CM

## 2021-06-29 ENCOUNTER — HOSPITAL ENCOUNTER (OUTPATIENT)
Dept: ULTRASOUND IMAGING | Age: 31
Discharge: HOME | End: 2021-06-29
Attending: OBSTETRICS & GYNECOLOGY
Payer: COMMERCIAL

## 2021-06-29 DIAGNOSIS — T85.698A OTHER MECHANICAL COMPLICATION OF OTHER SPECIFIED INTERNAL PROSTHETIC DEVICES, IMPLANTS AND GRAFTS, INITIAL ENCOUNTER: ICD-10-CM

## 2021-06-29 PROCEDURE — 76882 US LMTD JT/FCL EVL NVASC XTR: CPT

## 2021-10-29 ENCOUNTER — APPOINTMENT (OUTPATIENT)
Dept: CT IMAGING | Age: 31
End: 2021-10-29
Attending: EMERGENCY MEDICINE
Payer: COMMERCIAL

## 2021-10-29 ENCOUNTER — APPOINTMENT (OUTPATIENT)
Dept: MRI IMAGING | Age: 31
End: 2021-10-29
Attending: FAMILY MEDICINE
Payer: COMMERCIAL

## 2021-10-29 ENCOUNTER — HOSPITAL ENCOUNTER (OUTPATIENT)
Age: 31
Setting detail: OBSERVATION
Discharge: HOME | End: 2021-10-30
Attending: EMERGENCY MEDICINE | Admitting: FAMILY MEDICINE
Payer: COMMERCIAL

## 2021-10-29 DIAGNOSIS — R20.2 NUMBNESS AND TINGLING: ICD-10-CM

## 2021-10-29 DIAGNOSIS — F80.9 DELAYED SPEECH: ICD-10-CM

## 2021-10-29 DIAGNOSIS — R20.0 NUMBNESS AND TINGLING: ICD-10-CM

## 2021-10-29 DIAGNOSIS — R53.83 LETHARGY: Primary | ICD-10-CM

## 2021-10-29 PROBLEM — R47.81 SLURRED SPEECH: Status: ACTIVE | Noted: 2021-10-29

## 2021-10-29 PROBLEM — E88.81 METABOLIC SYNDROME: Status: ACTIVE | Noted: 2021-10-29

## 2021-10-29 LAB
ALBUMIN SERPL-MCNC: 3.3 G/DL (ref 3.4–5)
ALBUMIN/GLOB SERPL: 0.6 {RATIO} (ref 0.8–1.7)
ALP SERPL-CCNC: 66 U/L (ref 45–117)
ALT SERPL-CCNC: 21 U/L (ref 13–56)
ANION GAP SERPL CALC-SCNC: 4 MMOL/L (ref 3–18)
APTT PPP: 28.1 SEC (ref 23–36.4)
AST SERPL-CCNC: 14 U/L (ref 10–38)
ATRIAL RATE: 88 BPM
BASOPHILS # BLD: 0 K/UL (ref 0–0.1)
BASOPHILS NFR BLD: 0 % (ref 0–2)
BILIRUB SERPL-MCNC: 0.6 MG/DL (ref 0.2–1)
BUN SERPL-MCNC: 9 MG/DL (ref 7–18)
BUN/CREAT SERPL: 13 (ref 12–20)
CALCIUM SERPL-MCNC: 9 MG/DL (ref 8.5–10.1)
CALCULATED P AXIS, ECG09: 20 DEGREES
CALCULATED R AXIS, ECG10: 38 DEGREES
CALCULATED T AXIS, ECG11: 67 DEGREES
CHLORIDE SERPL-SCNC: 108 MMOL/L (ref 100–111)
CO2 SERPL-SCNC: 25 MMOL/L (ref 21–32)
CREAT SERPL-MCNC: 0.72 MG/DL (ref 0.6–1.3)
DIAGNOSIS, 93000: NORMAL
DIFFERENTIAL METHOD BLD: ABNORMAL
EOSINOPHIL # BLD: 0.1 K/UL (ref 0–0.4)
EOSINOPHIL NFR BLD: 1 % (ref 0–5)
ERYTHROCYTE [DISTWIDTH] IN BLOOD BY AUTOMATED COUNT: 14.5 % (ref 11.6–14.5)
GLOBULIN SER CALC-MCNC: 5.2 G/DL (ref 2–4)
GLUCOSE BLD STRIP.AUTO-MCNC: 102 MG/DL (ref 70–110)
GLUCOSE SERPL-MCNC: 112 MG/DL (ref 74–99)
HCT VFR BLD AUTO: 38 % (ref 35–45)
HGB BLD-MCNC: 12.2 G/DL (ref 12–16)
INR PPP: 1.1 (ref 0.8–1.2)
LYMPHOCYTES # BLD: 1.2 K/UL (ref 0.9–3.6)
LYMPHOCYTES NFR BLD: 29 % (ref 21–52)
MCH RBC QN AUTO: 28.2 PG (ref 24–34)
MCHC RBC AUTO-ENTMCNC: 32.1 G/DL (ref 31–37)
MCV RBC AUTO: 88 FL (ref 78–100)
MONOCYTES # BLD: 0.3 K/UL (ref 0.05–1.2)
MONOCYTES NFR BLD: 6 % (ref 3–10)
NEUTS SEG # BLD: 2.7 K/UL (ref 1.8–8)
NEUTS SEG NFR BLD: 63 % (ref 40–73)
P-R INTERVAL, ECG05: 140 MS
PLATELET # BLD AUTO: 323 K/UL (ref 135–420)
PMV BLD AUTO: 9.9 FL (ref 9.2–11.8)
POTASSIUM SERPL-SCNC: 3.6 MMOL/L (ref 3.5–5.5)
PROT SERPL-MCNC: 8.5 G/DL (ref 6.4–8.2)
PROTHROMBIN TIME: 13.4 SEC (ref 11.5–15.2)
Q-T INTERVAL, ECG07: 382 MS
QRS DURATION, ECG06: 76 MS
QTC CALCULATION (BEZET), ECG08: 462 MS
RBC # BLD AUTO: 4.32 M/UL (ref 4.2–5.3)
SODIUM SERPL-SCNC: 137 MMOL/L (ref 136–145)
TROPONIN I SERPL-MCNC: <0.02 NG/ML (ref 0–0.04)
VENTRICULAR RATE, ECG03: 88 BPM
WBC # BLD AUTO: 4.2 K/UL (ref 4.6–13.2)

## 2021-10-29 PROCEDURE — 97535 SELF CARE MNGMENT TRAINING: CPT

## 2021-10-29 PROCEDURE — 80053 COMPREHEN METABOLIC PANEL: CPT

## 2021-10-29 PROCEDURE — 74011250637 HC RX REV CODE- 250/637: Performed by: EMERGENCY MEDICINE

## 2021-10-29 PROCEDURE — 84484 ASSAY OF TROPONIN QUANT: CPT

## 2021-10-29 PROCEDURE — 74011250636 HC RX REV CODE- 250/636: Performed by: FAMILY MEDICINE

## 2021-10-29 PROCEDURE — 99218 HC RM OBSERVATION: CPT

## 2021-10-29 PROCEDURE — 97161 PT EVAL LOW COMPLEX 20 MIN: CPT

## 2021-10-29 PROCEDURE — 93005 ELECTROCARDIOGRAM TRACING: CPT

## 2021-10-29 PROCEDURE — 74011250637 HC RX REV CODE- 250/637: Performed by: FAMILY MEDICINE

## 2021-10-29 PROCEDURE — 70498 CT ANGIOGRAPHY NECK: CPT

## 2021-10-29 PROCEDURE — 99285 EMERGENCY DEPT VISIT HI MDM: CPT

## 2021-10-29 PROCEDURE — 85610 PROTHROMBIN TIME: CPT

## 2021-10-29 PROCEDURE — 70551 MRI BRAIN STEM W/O DYE: CPT

## 2021-10-29 PROCEDURE — 74011000636 HC RX REV CODE- 636: Performed by: EMERGENCY MEDICINE

## 2021-10-29 PROCEDURE — 82962 GLUCOSE BLOOD TEST: CPT

## 2021-10-29 PROCEDURE — 74011250637 HC RX REV CODE- 250/637: Performed by: PSYCHIATRY & NEUROLOGY

## 2021-10-29 PROCEDURE — 97165 OT EVAL LOW COMPLEX 30 MIN: CPT

## 2021-10-29 PROCEDURE — 85730 THROMBOPLASTIN TIME PARTIAL: CPT

## 2021-10-29 PROCEDURE — 70450 CT HEAD/BRAIN W/O DYE: CPT

## 2021-10-29 PROCEDURE — 85025 COMPLETE CBC W/AUTO DIFF WBC: CPT

## 2021-10-29 RX ORDER — GUAIFENESIN 100 MG/5ML
243 LIQUID (ML) ORAL
Status: COMPLETED | OUTPATIENT
Start: 2021-10-29 | End: 2021-10-29

## 2021-10-29 RX ORDER — LABETALOL HCL 20 MG/4 ML
5 SYRINGE (ML) INTRAVENOUS
Status: DISCONTINUED | OUTPATIENT
Start: 2021-10-29 | End: 2021-10-30 | Stop reason: HOSPADM

## 2021-10-29 RX ORDER — GUAIFENESIN 100 MG/5ML
81 LIQUID (ML) ORAL DAILY
Status: DISCONTINUED | OUTPATIENT
Start: 2021-10-30 | End: 2021-10-30 | Stop reason: HOSPADM

## 2021-10-29 RX ORDER — SODIUM CHLORIDE 9 MG/ML
100 INJECTION, SOLUTION INTRAVENOUS CONTINUOUS
Status: DISPENSED | OUTPATIENT
Start: 2021-10-29 | End: 2021-10-30

## 2021-10-29 RX ORDER — POLYETHYLENE GLYCOL 3350 17 G/17G
17 POWDER, FOR SOLUTION ORAL DAILY PRN
Status: DISCONTINUED | OUTPATIENT
Start: 2021-10-29 | End: 2021-10-30 | Stop reason: HOSPADM

## 2021-10-29 RX ORDER — ATORVASTATIN CALCIUM 20 MG/1
40 TABLET, FILM COATED ORAL
Status: DISCONTINUED | OUTPATIENT
Start: 2021-10-29 | End: 2021-10-30 | Stop reason: HOSPADM

## 2021-10-29 RX ORDER — ACETAMINOPHEN 325 MG/1
650 TABLET ORAL
Status: DISCONTINUED | OUTPATIENT
Start: 2021-10-29 | End: 2021-10-29

## 2021-10-29 RX ORDER — GUAIFENESIN 100 MG/5ML
81 LIQUID (ML) ORAL DAILY
Status: DISCONTINUED | OUTPATIENT
Start: 2021-10-29 | End: 2021-10-30 | Stop reason: HOSPADM

## 2021-10-29 RX ORDER — BUTALBITAL, ACETAMINOPHEN AND CAFFEINE 50; 325; 40 MG/1; MG/1; MG/1
1 TABLET ORAL
Status: DISCONTINUED | OUTPATIENT
Start: 2021-10-29 | End: 2021-10-30 | Stop reason: HOSPADM

## 2021-10-29 RX ADMIN — IOPAMIDOL 100 ML: 755 INJECTION, SOLUTION INTRAVENOUS at 11:06

## 2021-10-29 RX ADMIN — ATORVASTATIN CALCIUM 40 MG: 20 TABLET, FILM COATED ORAL at 22:00

## 2021-10-29 RX ADMIN — ASPIRIN 81 MG: 81 TABLET, CHEWABLE ORAL at 11:20

## 2021-10-29 RX ADMIN — BUTALBITAL, ACETAMINOPHEN, AND CAFFEINE 1 TABLET: 50; 325; 40 TABLET ORAL at 20:55

## 2021-10-29 RX ADMIN — ASPIRIN 243 MG: 81 TABLET, CHEWABLE ORAL at 13:59

## 2021-10-29 RX ADMIN — SODIUM CHLORIDE 100 ML/HR: 900 INJECTION, SOLUTION INTRAVENOUS at 14:39

## 2021-10-29 RX ADMIN — ACETAMINOPHEN 650 MG: 325 TABLET ORAL at 13:58

## 2021-10-29 NOTE — PROGRESS NOTES
Problem: Patient Education: Go to Patient Education Activity  Goal: Patient/Family Education  Outcome: Progressing Towards Goal     Problem: Patient Education: Go to Patient Education Activity  Goal: Patient/Family Education  Outcome: Progressing Towards Goal     Problem: Ischemic Stroke: Discharge Outcomes  Goal: *Verbalizes anxiety and depression are reduced or absent  Outcome: Progressing Towards Goal  Goal: *Verbalize understanding of risk factor modification(Stroke Metric)  Outcome: Progressing Towards Goal  Goal: *Hemodynamically stable  Outcome: Progressing Towards Goal  Goal: *Absence of aspiration pneumonia  Outcome: Progressing Towards Goal  Goal: *Aware of needed dietary changes  Outcome: Progressing Towards Goal  Goal: *Verbalize understanding of prescribed medications including anti-coagulants, anti-lipid, and/or anti-platelets(Stroke Metric)  Outcome: Progressing Towards Goal  Goal: *Tolerating diet  Outcome: Progressing Towards Goal  Goal: *Aware of follow-up diagnostics related to anticoagulants  Outcome: Progressing Towards Goal  Goal: *Ability to perform ADLs and demonstrates progressive mobility and function  Outcome: Progressing Towards Goal  Goal: *Absence of DVT(Stroke Metric)  Outcome: Progressing Towards Goal  Goal: *Absence of aspiration  Outcome: Progressing Towards Goal  Goal: *Optimal pain control at patient's stated goal  Outcome: Progressing Towards Goal  Goal: *Home safety concerns addressed  Outcome: Progressing Towards Goal  Goal: *Describes available resources and support systems  Outcome: Progressing Towards Goal  Goal: *Verbalizes understanding of activation of EMS(911) for stroke symptoms(Stroke Metric)  Outcome: Progressing Towards Goal  Goal: *Understands and describes signs and symptoms to report to providers(Stroke Metric)  Outcome: Progressing Towards Goal  Goal: *Neurolgocially stable (absence of additional neurological deficits)  Outcome: Progressing Towards Goal  Goal: *Verbalizes importance of follow-up with primary care physician(Stroke Metric)  Outcome: Progressing Towards Goal  Goal: *Smoking cessation discussed,if applicable(Stroke Metric)  Outcome: Progressing Towards Goal  Goal: *Depression screening completed(Stroke Metric)  Outcome: Progressing Towards Goal

## 2021-10-29 NOTE — H&P
History & Physical    Patient: Brisa Stubbs MRN: [id number]  CSN: [id number]    YOB: 1990  Age: 27 y.o. Sex: female      DOA: 10/29/2021  Primary Care Provider:  SUSY Alegria      Assessment/Plan   76-year-old female with a history of obesity, metabolic syndrome who presented with change in speech, weakness and left-sided numbness and tingling. Admitted for evaluation for possible stroke or TIA. Admit to cardiac floor for telemetry monitoring  Needs to have 325 mg p.o. of aspirin on admission and then 81 mg p.o. daily afterwards  Neuro checks per stroke protocol  Permissive hypertension (do not treat if blood pressure is not greater than 200/110)  Labetalol 5 mg every 6 as needed blood pressure greater than 200/110  IV normal saline continuous infusion 100 cc/hr euglycemia with sliding scale insulin  Euthymia with acetaminophen 650 mg every 6 hours as needed for fever  Please avoid urinary catheters  Echocardiogram with bubble study  Lipid panel, hemoglobin A1c  Has SCDs and DVT prophylaxis  PT OT and SLP consults  MRI brain without contrast  Check B12 and folate levels  Dr. Cara Thomas, neurology consulted by emergency department  ADA diet, SSI, fingerstick blood glucose before every meal and nightly      Patient Active Problem List   Diagnosis Code    Normal spontaneous vaginal delivery O80    Laceration of vaginal wall or sulcus without perineal laceration during delivery O71.4    Slurred speech R47.81    Left sided numbness Q68.9    Metabolic syndrome C03.07     Estimated length of stay : less than 48 hours    CC: weakness, changes in speech       HPI:     Brisa Stubbs is a 27 y.o. female with h/o of obesity, metabolic syndrome/? early diabetes who presents to the emergency department C/O is weakness, changes in speech and left-sided numbness and tingling.   In the ER it was reported that she arrived at work approximately 45 minutes before presenting to the ER and was noted to be having difficulty completing her sentences with general lethargy by her supervisor and was brought in for stroke evaluation. In the emergency department she was found to have no focal deficits and her exam per the ED physician was inconsistent with her reported symptoms. There was reported that she did not have word salad or inability to speak she was a slow to respond and but did so appropriately with some moderate encouragement. She is outside the window to receive TPA. However, stroke alert was called and teleneurologist suggested admission and stroke evaluation. CT head and CTA neck unremarkable. Patient reported that she has never had these symptoms before. No past medical history on file. No past surgical history on file. No family history on file. Social History     Socioeconomic History    Marital status:      Spouse name: Not on file    Number of children: Not on file    Years of education: Not on file    Highest education level: Not on file   Tobacco Use    Smoking status: Never Smoker    Smokeless tobacco: Never Used   Substance and Sexual Activity    Alcohol use: No    Drug use: No    Sexual activity: Yes     Partners: Male     Social Determinants of Health     Financial Resource Strain:     Difficulty of Paying Living Expenses:    Food Insecurity:     Worried About Running Out of Food in the Last Year:     [address] in the Last Year:    Transportation Needs:     Lack of Transportation (Medical):      Lack of Transportation (Non-Medical):    Physical Activity:     Days of Exercise per Week:     Minutes of Exercise per Session:    Stress:     Feeling of Stress :    Social Connections:     Frequency of Communication with Friends and Family:     Frequency of Social Gatherings with Friends and Family:     Attends Nondenominational Services:     Active Member of Clubs or Organizations:     Attends Club or Organization Meetings:     Marital Status: Prior to Admission medications    Medication Sig Start Date End Date Taking? Authorizing Provider   ibuprofen (MOTRIN) 800 mg tablet Take 1 Tab by mouth every eight (8) hours as needed. 17   Roseanne Beaulieu MD   oxyCODONE-acetaminophen (PERCOCET) 5-325 mg per tablet Take 2 Tabs by mouth every four (4) hours as needed. Max Daily Amount: 12 Tabs. 17   Roseanne Beaulieu MD   [id number] loyd-folic-dha (PRENATAL DHA+COMPLETE PRENATAL) -881 mg-mcg-mg cmpk Take 1 Tab by mouth daily. Provider, Historical       No Known Allergies    Review of Systems  Gen: No fever, chills, malaise, weight loss/gain. Heent: No headache, rhinorrhea, epistaxis, ear pain, hearing loss, sinus pain, neck pain/stiffness, sore throat. Heart: No chest pain, palpitations, HILLS, pnd, or orthopnea. Resp: No cough, hemoptysis, wheezing and shortness of breath. GI: No nausea, vomiting, diarrhea, constipation, melena or hematochezia. : No urinary obstruction, dysuria or hematuria. Derm: No rash, new skin lesion or pruritis. Musc/skeletal: no bone or joint complains. Vasc: No edema, cyanosis or claudication. Endo: No heat/cold intolerance, no polyuria,polydipsia or polyphagia. Neuro: No unilateral weakness, numbness, tingling. No seizures. Heme: No easy bruising or bleeding. Physical Exam:     Physical Exam:  Visit Vitals  BP (!) 136/91   Pulse 88   Temp 98.9 °F (37.2 °C)   Resp 18   SpO2 100%           Temp (24hrs), Av.4 °F (36.9 °C), Min:98 °F (36.7 °C), Max:98.9 °F (37.2 °C)    No intake/output data recorded. No intake/output data recorded. General:  Awake, cooperative, no distress. Head:  Normocephalic, without obvious abnormality, atraumatic. Eyes:  Conjunctivae/corneas clear, sclera anicteric, PERRL, EOMs intact. Nose: Nares normal. No drainage or sinus tenderness. Throat: Lips, mucosa, and tongue normal.    Neck: Supple, symmetrical, trachea midline, no adenopathy. Lungs:   Clear to auscultation bilaterally. Heart:  Regular rate and rhythm, S1, S2 normal, no murmur, click, rub or gallop. Abdomen: Soft, non-tender. Bowel sounds normal. No masses,  No organomegaly. Extremities: Extremities normal, atraumatic, no cyanosis or edema. Capillary refill normal.   Pulses: 2+ and symmetric all extremities. Skin: Skin color as per ethnicity, turgor normal. No rashes or lesions   Neurologic: CNII-XII intact. No focal motor or sensory deficit. Labs Reviewed:    Recent Results (from the past 24 hour(s))   GLUCOSE, POC    Collection Time: 10/29/21  9:54 AM   Result Value Ref Range    Glucose (POC) 102 70 - 110 mg/dL   CBC WITH AUTOMATED DIFF    Collection Time: 10/29/21 10:10 AM   Result Value Ref Range    WBC 4.2 (L) 4.6 - 13.2 K/uL    RBC 4.32 4.20 - 5.30 M/uL    HGB 12.2 12.0 - 16.0 g/dL    HCT 38.0 35.0 - 45.0 %    MCV 88.0 78.0 - 100.0 FL    MCH 28.2 24.0 - 34.0 PG    MCHC 32.1 31.0 - 37.0 g/dL    RDW 14.5 11.6 - 14.5 %    PLATELET 117 183 - 416 K/uL    MPV 9.9 9.2 - 11.8 FL    NEUTROPHILS 63 40 - 73 %    LYMPHOCYTES 29 21 - 52 %    MONOCYTES 6 3 - 10 %    EOSINOPHILS 1 0 - 5 %    BASOPHILS 0 0 - 2 %    ABS. NEUTROPHILS 2.7 1.8 - 8.0 K/UL    ABS. LYMPHOCYTES 1.2 0.9 - 3.6 K/UL    ABS. MONOCYTES 0.3 0.05 - 1.2 K/UL    ABS. EOSINOPHILS 0.1 0.0 - 0.4 K/UL    ABS.  BASOPHILS 0.0 0.0 - 0.1 K/UL    DF AUTOMATED     TROPONIN I    Collection Time: 10/29/21 10:10 AM   Result Value Ref Range    Troponin-I, QT <0.02 0.0 - 6.379 NG/ML   METABOLIC PANEL, COMPREHENSIVE    Collection Time: 10/29/21 10:10 AM   Result Value Ref Range    Sodium 137 136 - 145 mmol/L    Potassium 3.6 3.5 - 5.5 mmol/L    Chloride 108 100 - 111 mmol/L    CO2 25 21 - 32 mmol/L    Anion gap 4 3.0 - 18 mmol/L    Glucose 112 (H) 74 - 99 mg/dL    BUN 9 7.0 - 18 MG/DL    Creatinine 0.72 0.6 - 1.3 MG/DL    BUN/Creatinine ratio 13 12 - 20      GFR est AA >60 >60 ml/min/1.73m2    GFR est non-AA >60 >60 ml/min/1.73m2    Calcium 9.0 8.5 - 10.1 MG/DL    Bilirubin, total 0.6 0.2 - 1.0 MG/DL    ALT (SGPT) 21 13 - 56 U/L    AST (SGOT) 14 10 - 38 U/L    Alk.  phosphatase 66 45 - 117 U/L    Protein, total 8.5 (H) 6.4 - 8.2 g/dL    Albumin 3.3 (L) 3.4 - 5.0 g/dL    Globulin 5.2 (H) 2.0 - 4.0 g/dL    A-G Ratio 0.6 (L) 0.8 - 1.7     PROTHROMBIN TIME + INR    Collection Time: 10/29/21 10:20 AM   Result Value Ref Range    Prothrombin time 13.4 11.5 - 15.2 sec    INR 1.1 0.8 - 1.2     PTT    Collection Time: 10/29/21 10:20 AM   Result Value Ref Range    aPTT 28.1 23.0 - 36.4 SEC   EKG, 12 LEAD, INITIAL    Collection Time: 10/29/21 11:43 AM   Result Value Ref Range    Ventricular Rate 88 BPM    Atrial Rate 88 BPM    P-R Interval 140 ms    QRS Duration 76 ms    Q-T Interval 382 ms    QTC Calculation (Bezet) 462 ms    Calculated P Axis 20 degrees    Calculated R Axis 38 degrees    Calculated T Axis 67 degrees    Diagnosis       Normal sinus rhythm with sinus arrhythmia  Cannot rule out Anterior infarct , age undetermined  Abnormal ECG  No previous ECGs available  Confirmed by Magdalene Villa MD, -- ([id number]) on 10/29/2021 5:22:50 PM         Procedures/imaging: see electronic medical records for all procedures/Xrays and details which were not copied into this note but were reviewed prior to creation of Plan      CC: Scooby Andersen

## 2021-10-29 NOTE — ED PROVIDER NOTES
EMERGENCY DEPARTMENT HISTORY AND PHYSICAL EXAM    Date: 10/29/2021  Patient Name: Guanaco Stubbs    History of Presenting Illness     Chief Complaint   Patient presents with    Dysarthria         History Provided By: Patient      Guanaco Stubbs is a 27 y.o. female with PMHX of obesity, borderline diabetes who presents to the emergency department C/O is weakness, changes in speech. Patient works upstairs, reportedly when she got to work approximately 45 minutes ago, patient was noted to be having difficulty completing her sentences with general lethargy by her boss. She was brought down for stroke evaluation. Patient noted to be jittery in her hands. Patient woke up around 6 and states that she had been fine at home. She has never had this happen previously. PCP: SUSY Mcpherson    Current Facility-Administered Medications   Medication Dose Route Frequency Provider Last Rate Last Admin    aspirin chewable tablet 81 mg  81 mg Oral DAILY aJzmin Chen MD   81 mg at 10/29/21 1120     Current Outpatient Medications   Medication Sig Dispense Refill    ibuprofen (MOTRIN) 800 mg tablet Take 1 Tab by mouth every eight (8) hours as needed. 60 Tab 1    oxyCODONE-acetaminophen (PERCOCET) 5-325 mg per tablet Take 2 Tabs by mouth every four (4) hours as needed. Max Daily Amount: 12 Tabs. 30 Tab 0    [id number] loyd-folic-dha (PRENATAL DHA+COMPLETE PRENATAL) -300 mg-mcg-mg cmpk Take 1 Tab by mouth daily. Past History     Past Medical History:  Borderline diabetes  Obesity    Past Surgical History:  No past surgical history on file. Family History:  No family history on file.     Social History:  Social History     Tobacco Use    Smoking status: Never Smoker    Smokeless tobacco: Never Used   Substance Use Topics    Alcohol use: No    Drug use: No       Allergies:  No Known Allergies      Review of Systems   Review of Systems   Constitutional: Negative for activity change and fever. HENT: Negative for congestion and sore throat. Eyes: Negative for discharge. Respiratory: Negative for apnea. Cardiovascular: Negative for chest pain. Gastrointestinal: Negative for abdominal distention. Genitourinary: Negative for dysuria and flank pain. Musculoskeletal: Negative for arthralgias. Skin: Negative for rash. Neurological: Positive for speech difficulty. Negative for dizziness and weakness. Hematological: Negative for adenopathy. Psychiatric/Behavioral: Negative for agitation. All other systems reviewed and are negative. Physical Exam     Vitals:    10/29/21 1115 10/29/21 1125 10/29/21 1130 10/29/21 1133   BP: 136/69 (!) 149/85 129/79    Pulse:  96 76 90   Resp:  19  18   Temp:       SpO2:  100% 100% 100%     Physical Exam    Nursing notes and vital signs reviewed    Constitutional: Non toxic appearing, moderate distress  Head: Normocephalic, Atraumatic  Eyes: EOMI  Neck: Supple  Cardiovascular: Regular rate and rhythm, no murmurs, rubs, or gallops  Chest: Normal work of breathing and chest excursion bilaterally  Lungs: Clear to ausculation bilaterally  Abdomen: Soft, non tender, non distended, normoactive bowel sounds  Back: No evidence of trauma or deformity  Extremities: No evidence of trauma or deformity, no LE edema  Skin: Warm and dry, normal cap refill  Alert and Oriented to person, place, and time. No evidence of confusion  CN: Visual fields intact, Visual acuities are grossly intact to finger. PERRLA, EOMI. Sensation intact to light touch in the V1,V2, V3 distribution. Patient smiles/frowns, elevates eyebrows symmetrically and closes eyes tightly against force. Hearing intact to finger rub bilaterally. Palate and Uvula elevate midline/symmetrically. Patient able to shrug shoulders, and move head left/right against force. Tongue protrudes midline. Cerebellar: Rapid alternating movements are intact.  Gait is normal. Tandem gait is normal No evidence of truncal ataxia with eyes closed sitting up in bed. Heel to shin are normal and finger to nose is normal. Romberg is negative. No drift  Sensation: Intact to light touch upper and lower extremity in the proximal and distal areas bilateral.  Motor: 5/5 strength bilaterally shoulder/elbow/wrist flex and extension. 5/5 bilaterally hip/knee/ankle in flex and extension  Speech: Minimally delayed speech, though no word finding difficulty noted  Psychiatric: Normal mood and affect      Diagnostic Study Results     Labs -     Recent Results (from the past 12 hour(s))   GLUCOSE, POC    Collection Time: 10/29/21  9:54 AM   Result Value Ref Range    Glucose (POC) 102 70 - 110 mg/dL   CBC WITH AUTOMATED DIFF    Collection Time: 10/29/21 10:10 AM   Result Value Ref Range    WBC 4.2 (L) 4.6 - 13.2 K/uL    RBC 4.32 4.20 - 5.30 M/uL    HGB 12.2 12.0 - 16.0 g/dL    HCT 38.0 35.0 - 45.0 %    MCV 88.0 78.0 - 100.0 FL    MCH 28.2 24.0 - 34.0 PG    MCHC 32.1 31.0 - 37.0 g/dL    RDW 14.5 11.6 - 14.5 %    PLATELET 290 256 - 716 K/uL    MPV 9.9 9.2 - 11.8 FL    NEUTROPHILS 63 40 - 73 %    LYMPHOCYTES 29 21 - 52 %    MONOCYTES 6 3 - 10 %    EOSINOPHILS 1 0 - 5 %    BASOPHILS 0 0 - 2 %    ABS. NEUTROPHILS 2.7 1.8 - 8.0 K/UL    ABS. LYMPHOCYTES 1.2 0.9 - 3.6 K/UL    ABS. MONOCYTES 0.3 0.05 - 1.2 K/UL    ABS. EOSINOPHILS 0.1 0.0 - 0.4 K/UL    ABS.  BASOPHILS 0.0 0.0 - 0.1 K/UL    DF AUTOMATED     TROPONIN I    Collection Time: 10/29/21 10:10 AM   Result Value Ref Range    Troponin-I, QT <0.02 0.0 - 2.768 NG/ML   METABOLIC PANEL, COMPREHENSIVE    Collection Time: 10/29/21 10:10 AM   Result Value Ref Range    Sodium 137 136 - 145 mmol/L    Potassium 3.6 3.5 - 5.5 mmol/L    Chloride 108 100 - 111 mmol/L    CO2 25 21 - 32 mmol/L    Anion gap 4 3.0 - 18 mmol/L    Glucose 112 (H) 74 - 99 mg/dL    BUN 9 7.0 - 18 MG/DL    Creatinine 0.72 0.6 - 1.3 MG/DL    BUN/Creatinine ratio 13 12 - 20      GFR est AA >60 >60 ml/min/1.73m2    GFR est non-AA >60 >60 ml/min/1.73m2    Calcium 9.0 8.5 - 10.1 MG/DL    Bilirubin, total 0.6 0.2 - 1.0 MG/DL    ALT (SGPT) 21 13 - 56 U/L    AST (SGOT) 14 10 - 38 U/L    Alk. phosphatase 66 45 - 117 U/L    Protein, total 8.5 (H) 6.4 - 8.2 g/dL    Albumin 3.3 (L) 3.4 - 5.0 g/dL    Globulin 5.2 (H) 2.0 - 4.0 g/dL    A-G Ratio 0.6 (L) 0.8 - 1.7     PROTHROMBIN TIME + INR    Collection Time: 10/29/21 10:20 AM   Result Value Ref Range    Prothrombin time 13.4 11.5 - 15.2 sec    INR 1.1 0.8 - 1.2     PTT    Collection Time: 10/29/21 10:20 AM   Result Value Ref Range    aPTT 28.1 23.0 - 36.4 SEC   EKG, 12 LEAD, INITIAL    Collection Time: 10/29/21 11:43 AM   Result Value Ref Range    Ventricular Rate 88 BPM    Atrial Rate 88 BPM    P-R Interval 140 ms    QRS Duration 76 ms    Q-T Interval 382 ms    QTC Calculation (Bezet) 462 ms    Calculated P Axis 20 degrees    Calculated R Axis 38 degrees    Calculated T Axis 67 degrees    Diagnosis       Normal sinus rhythm with sinus arrhythmia  Cannot rule out Anterior infarct , age undetermined  Abnormal ECG  No previous ECGs available         Radiologic Studies -   CT HEAD WO CONT   Final Result         1. No acute intracranial abnormality demonstrated. CRITICAL RESULT:  CODE S stroke result called to Dr. Mary Davenport in the emergency   room prior to dictation at 10:17 AM, 10/29/2021      CTA HEAD NECK W CONT    (Results Pending)     CT Results  (Last 48 hours)               10/29/21 1015  CT HEAD WO CONT Final result    Impression:          1. No acute intracranial abnormality demonstrated. CRITICAL RESULT:  CODE S stroke result called to Dr. Mary Davenport in the emergency   room prior to dictation at 10:17 AM, 10/29/2021       Narrative:  EXAM: CT head       INDICATION: Denies weakness and slurred speech. COMPARISON: None.        TECHNIQUE: Axial CT imaging of the head was performed without intravenous   contrast. Standard multiplanar coronal and sagittal reformatted images were   obtained and are included in interpretation. One or more dose reduction techniques were used on this CT: automated exposure   control, adjustment of the mAs and/or kVp according to patient size, and   iterative reconstruction techniques. The specific techniques used on this CT   exam have been documented in the patient's electronic medical record. Digital   Imaging and Communications in Medicine (DICOM) format image data are available   to nonaffiliated external healthcare facilities or entities on a secure, media   free, reciprocally searchable basis with patient authorization for at least a   12-month period after this study. _______________       FINDINGS:       BRAIN AND POSTERIOR FOSSA: The sulci, folia, ventricles and basal cisterns are   within normal limits for the patient?s age. There is no intracranial hemorrhage,   mass effect, or midline shift. Basal ganglia calcifications. EXTRA-AXIAL SPACES AND MENINGES: No acute extra-axial fluid collection. CALVARIUM: Intact. SINUSES: Clear. OTHER: None.       _______________               CXR Results  (Last 48 hours)    None          Medications given in the ED-  Medications   aspirin chewable tablet 81 mg (81 mg Oral Given 10/29/21 1120)   iopamidoL (ISOVUE-370) 76 % injection 100 mL (100 mL IntraVENous Given 10/29/21 1106)         Medical Decision Making   I am the first provider for this patient. I reviewed the vital signs, available nursing notes, past medical history, past surgical history, family history and social history. Vital Signs-Reviewed the patient's vital signs. Pulse Oximetry Analysis - 98% on room air, not hypoxic     Cardiac Monitor:  Rate: 87 bpm  Rhythm: Normal sinus rhythm    EKG:  Read by Alexsandra Santos MD at 1143  Normal sinus rhythm, rate 88  Normal intervals, , QRS 76, QTc 462  No ST elevation or T wave inversions  No previous EKG for comparison    Records Reviewed:  Old Medical Records    Provider Notes (Medical Decision Making): Lina Stubbs is a 59-year-old female presents for evaluation of delayed speech with an onset of 45 minutes prior to arrival.  On arrival here, patient is noted to have slightly slurred speech, though no true dysarthria, word finding difficulties, patient had been activated as a code stroke. Discussed with the teleneurologist who request that we premixed TPA given the onset no contraindication. At the time of his evaluation, patient also noted some hand tingling and numbness at the time of wake up. Given that this is a wake-up stroke at 6 AM, patient now falls outside of the window of TPA and is not likely to be a candidate. CT noncontrast of the head obtained and found to be unremarkable. Blood glucose normal.  Patient recommended to obtain CT angiography of the head and neck to further evaluate for large vessel occlusion, though this is felt to be less likely. Baby aspirin recommended. Patient will be admitted to the hospitalist for further stroke evaluation. Dr. Brenner Has of neurology notified. Procedures:  Procedures    ED Course: CT angiography negative for large vessel occlusion or any evidence of stenosis. Diagnosis and Disposition     CONSULT NOTE:   1106 AM  I spoke with Dr. Brenner Has   Specialty: Neurology  Discussed pt's hx, disposition, and available diagnostic and imaging results. Reviewed care plans. Consulting physician agrees with plans as outlined. Written by Frederick Mayorga MD    ADMISSION NOTE:  5574 PM  Patient is being admitted to the hospital by Dr. Nasrin Ku. The results of their tests and reasons for their admission have been discussed with them and/or available family. They convey agreement and understanding for the need to be admitted and for their admission diagnosis. Written by Frederick Mayorga MD    CONDITIONS ON ADMISSION:  Deep Vein Thrombosis is not present at the time of admission.  Thrombosis is not present at the time of admission. Urinary Tract Infection is not present at the time of admission. Pneumonia is not present at the time of admission. MRSA is not present at the time of admission. Wound infection is not present at the time of admission. Pressure Ulcer is not present at the time of admission. CLINICAL IMPRESSION    1. Lethargy    2. Numbness and tingling    3. Delayed speech      _______________________________      Please note that this dictation was completed with [employer]iner, the computer voice recognition software. Quite often unanticipated grammatical, syntax, homophones, and other interpretive errors are inadvertently transcribed by the computer software. Please disregard these errors. Please excuse any errors that have escaped final proofreading.

## 2021-10-29 NOTE — PROGRESS NOTES
PHYSICAL THERAPY EVALUATION AND DISCHARGE    Patient: Gaurav Stubbs (28 y.o. female)  Date: 10/29/2021  Primary Diagnosis: Slurred speech [R47.81]        Precautions: fall       PLOF: independent with mobility without an assistive device, lives with family, several steps to enter one story home    ASSESSMENT :  Based on the objective data described below, the patient presents at an independent level with transfers and supervision for ambulation for safety but no loss of balance was noted. No loss of balance when standing with feet together and eyes closed for >15 seconds. Patient does not require further skilled intervention at this level of care. PLAN :  Recommendations and Planned Interventions:   No formal PT needs identified at this time. Discharge Recommendations: None  Further Equipment Recommendations for Discharge: N/A     SUBJECTIVE:   Patient stated I'm feeling better.     OBJECTIVE DATA SUMMARY:   No past medical history on file. No past surgical history on file. Barriers to Learning/Limitations: None  Compensate with: N/A  Home Situation:    One story home, several steps to enter    Critical Behavior:  Neurologic State: Alert  Orientation Level: Appropriate for age;Oriented X4  Cognition: Follows commands; Appropriate decision making      Strength:    Strength: Within functional limits      Tone & Sensation:   Tone: Normal   Sensation: Intact   Range Of Motion:  AROM: Within functional limits       Functional Mobility:  Bed Mobility:  Supine to Sit: Independent     Transfers:  Sit to Stand: Independent  Stand to Sit: Independent  Balance:   Sitting: Intact  Standing: Intact; Without support    Ambulation/Gait Training:  Distance (ft): 400 Feet (ft)  Assistive Device:  (none)  Ambulation - Level of Assistance: Supervision     Pain:  Pain level pre-treatment: 4/10   Pain level post-treatment: 4/10 headache  Pain Intervention(s):  Rest, Repositioning   Response to intervention: Nurse notified, See doc flow    Activity Tolerance:   good  Please refer to the flowsheet for vital signs taken during this treatment. After treatment:   []         Patient left in no apparent distress sitting up in chair  [x]         Patient left in no apparent distress sitting edge of bed  [x]         Call bell left within reach  [x]         Nursing notified  []         Caregiver present  []         Bed alarm activated  []         SCDs applied    COMMUNICATION/EDUCATION:   [x]         Role of Physical Therapy in the acute care setting. [x]         Fall prevention education was provided and the patient/caregiver indicated understanding. [x]         Patient/family have participated as able in goal setting and plan of care.-eval only  []         Patient/family agree to work toward stated goals and plan of care. []         Patient understands intent and goals of therapy, but is neutral about his/her participation. []         Patient is unable to participate in goal setting/plan of care: ongoing with therapy staff.  []         Other:     Thank you for this referral.  Ailyn Nick, PT   Time Calculation: 15 mins      Eval Complexity: History: MEDIUM  Complexity : 1-2 comorbidities / personal factors will impact the outcome/ POC Exam:MEDIUM Complexity : 3 Standardized tests and measures addressing body structure, function, activity limitation and / or participation in recreation  Presentation: LOW Complexity : Stable, uncomplicated  Clinical Decision Making:Low Complexity    Overall Complexity:LOW

## 2021-10-29 NOTE — CONSULTS
NEUROLOGY CONSULTATION NOTE    Patient: Juan Pablo Stubbs MRN: [id number]  CSN: [id number]    YOB: 1990  Age: 27 y.o. Sex: female    DOA: 10/29/2021 LOS:  LOS: 0 days        Requesting Physician: Dr. Ashley Aguayo  Reason for Consultation: paresthesia            HISTORY OF PRESENT ILLNESS:   Juan Pablo Stubbs is a 27 y.o. female presented to emergency room today for sudden onset of paresthesia. Patient went to bed last night. This morning during work, she had a hard time focusing and had paresthesia in left face. She also has mild headache. Patient endorsed great stress lately and stay late to finish work. She denies previous similar symptoms. Her symptoms lasted for about a couple of hours and started to subside. When I examined the patient, she has mild paresthesia left forehead and cheek. PAST MEDICAL HISTORY:  No past medical history on file. PAST SURGICAL HISTORY:  No past surgical history on file. FAMILY HISTORY:  No family history on file. SOCIAL HISTORY:  Social History     Socioeconomic History    Marital status:      Spouse name: Not on file    Number of children: Not on file    Years of education: Not on file    Highest education level: Not on file   Tobacco Use    Smoking status: Never Smoker    Smokeless tobacco: Never Used   Substance and Sexual Activity    Alcohol use: No    Drug use: No    Sexual activity: Yes     Partners: Male     Social Determinants of Health     Financial Resource Strain:     Difficulty of Paying Living Expenses:    Food Insecurity:     Worried About Running Out of Food in the Last Year:     [address] in the Last Year:    Transportation Needs:     Lack of Transportation (Medical):      Lack of Transportation (Non-Medical):    Physical Activity:     Days of Exercise per Week:     Minutes of Exercise per Session:    Stress:     Feeling of Stress :    Social Connections:     Frequency of Communication with Friends and Family:     Frequency of Social Gatherings with Friends and Family:     Attends [employer] orth[employer] Services:     Active Member of Clubs or Organizations:     Attends Club or Organization Meetings:     Marital Status:      MEDICATIONS:  Current Facility-Administered Medications   Medication Dose Route Frequency    aspirin chewable tablet 81 mg  81 mg Oral DAILY    0.9% sodium chloride infusion  100 mL/hr IntraVENous CONTINUOUS    [START ON 10/30/2021] aspirin chewable tablet 81 mg  81 mg Oral DAILY    atorvastatin (LIPITOR) tablet 40 mg  40 mg Oral QHS    labetaloL (NORMODYNE;TRANDATE) 20 mg/4 mL (5 mg/mL) injection 5 mg  5 mg IntraVENous Q10MIN PRN    acetaminophen (TYLENOL) tablet 650 mg  650 mg Oral Q4H PRN    polyethylene glycol (MIRALAX) packet 17 g  17 g Oral DAILY PRN    influenza vaccine 2021-22 (6 mos+)(PF) (FLUARIX/FLULAVAL/FLUZONE QUAD) injection 0.5 mL  1 Each IntraMUSCular PRIOR TO DISCHARGE     Prior to Admission medications    Medication Sig Start Date End Date Taking? Authorizing Provider   ibuprofen (MOTRIN) 800 mg tablet Take 1 Tab by mouth every eight (8) hours as needed. 9/23/17   Jorgito Padilla MD   oxyCODONE-acetaminophen (PERCOCET) 5-325 mg per tablet Take 2 Tabs by mouth every four (4) hours as needed. Max Daily Amount: 12 Tabs. 9/23/17   Jorgito Padilla MD   [id number]-MWTX loyd-folic-dha (PRENATAL DHA+COMPLETE PRENATAL) [id number] mg-mcg-mg cmpk Take 1 Tab by mouth daily. Provider, Historical       ALLERGIES:  No Known Allergies    Review of Systems  GENERAL: No fevers or chills. HEENT: No change in vision, earache, tinnitus, sore throat or sinus congestion. NECK: No pain or stiffness. CARDIOVASCULAR: No chest pain or pressure. No palpitations. PULMONARY: No shortness of breath, cough or wheeze. GASTROINTESTINAL: No abdominal pain, nausea, vomiting or diarrhea. GENITOURINARY: No urinary frequency, urgency, hesitancy or dysuria.    MUSCULOSKELETAL: No joint or muscle pain, no back pain, no recent trauma. DERMATOLOGIC: No rash, no itching, no lesions. ENDOCRINE: No polyuria, polydipsia, no heat or cold intolerance. No recent change in weight. HEMATOLOGICAL: No anemia or easy bruising or bleeding. NEUROLOGIC: Numbness. PHYSICAL EXAMINATION:     Visit Vitals  BP (!) 141/108   Pulse 91   Temp 98 °F (36.7 °C)   Resp 18   SpO2 100%         GENERAL: Pleasant, in no apparent distress. HEENT: Moist mucous membranes, sclerae anicteric, scalp is atraumatic. CVS: Regular rate and rhythm, no murmurs or gallops. No carotid bruits. PULMONARY: Clear to auscultation bilaterally. No rales or rhonchi. No wheezing. EXTREMITIES: Normal range of motion at all sites. No deformities. ABDOMEN: Soft, nontender. SKIN: No rashes or ecchymoses. Warm and dry. NEUROLOGIC: Alert and oriented x3. Speech is fluent without any aphasia or dysarthria. Cranial nerves: Face is symmetric with symmetric smile. Decreased light touch left cheek. Extraocular movements are intact with no nystagmus. Visual fields are full to confrontation. PERRL. Tongue is midline. Palate elevates symmetrically. Hearing intact to speech. Sternocleidomastoid and trapezius strengths are full bilaterally. Motor: Normal tone and normal bulk on all four extremities. Strength is full on all four segmentally. There is no pronator drift or orbiting. Sensory: Intact to pinprick and touch on all four. Normal vibratory sensation on toes bilaterally. Coordination: Intact coordination with finger-nose-finger bilaterally. Normal fine movements. No bradykinesia detected. Deep tendon reflexes: 2+ at biceps, brachioradialis, patella and ankles bilaterally. Toes are down-going bilaterally. Gait assessment: Able to stand and walk with no difficulty. Able to perform tandem gait with no difficulty.      Labs: Results:       Chemistry Recent Labs     10/29/21  1010   *      K 3.6      CO2 25   BUN 9   CREA 0. 72   CA 9.0   AGAP 4   BUCR 13   AP 66   TP 8.5*   ALB 3.3*   GLOB 5.2*   AGRAT 0.6*      CBC w/Diff Recent Labs     10/29/21  1010   WBC 4.2*   RBC 4.32   HGB 12.2   HCT 38.0      GRANS 63   LYMPH 29   EOS 1      Cardiac Enzymes No results for input(s): CPK, CKND1, ANNABELLE in the last 72 hours. No lab exists for component: CKRMB, TROIP   Coagulation Recent Labs     10/29/21  1020   PTP 13.4   INR 1.1   APTT 28.1       Lipid Panel No results found for: CHOL, CHOLPOCT, CHOLX, CHLST, CHOLV, 361821, HDL, HDLP, LDL, LDLC, DLDLP, 108223, VLDLC, VLDL, TGLX, TRIGL, TRIGP, TGLPOCT, CHHD, CHHDX   BNP No results for input(s): BNPP in the last 72 hours. Liver Enzymes Recent Labs     10/29/21  1010   TP 8.5*   ALB 3.3*   AP 66      Thyroid Studies No results found for: T4, T3U, TSH, TSHEXT       Radiology:  MRI BRAIN WO CONT    Result Date: 10/29/2021  EXAM: MRI brain without gadolinium CLINICAL INDICATION/HISTORY: Slurred speech with left-sided numbness   > Additional: None. COMPARISON: CT head and CTA head and neck same day   > Reference Exam: None. TECHNIQUE: Sagittal T1, axial T1, T2, FLAIR, T2 gradient, diffusion and coronal T1 and T2 sequences obtained of the brain. _______________ FINDINGS: Diffusion:  There are no areas of restricted diffusion, no evidence of an acute infarction. Brain parenchyma:  Several minimal bilateral frontal white matter T2 hyperintense foci with no other cortical or white matter signal abnormality. No evidence of mass hemorrhage edema or mass effect. Ventricles and sulci:  Normal.  No significant atrophy given age. Extra-axial:  No extra-axial fluid collection or mass is noted. Brain vasculature:  No vascular abnormality is appreciated on this routine brain MR examination. Craniocervical junction:  Normal. Skull base, extracranial and calvarium:  Mild residual adenoidal hypertrophy. Orbits sinuses IACs and mastoids, sella and skull unremarkable. _______________     1.  No evidence of acute infarct or other acute intracranial finding. 2. Minimal frontal white matter signal changes very nonspecific, commonly seen in patients of all ages. CT HEAD WO CONT    Result Date: 10/29/2021  EXAM: CT head INDICATION: Denies weakness and slurred speech. COMPARISON: None. TECHNIQUE: Axial CT imaging of the head was performed without intravenous contrast. Standard multiplanar coronal and sagittal reformatted images were obtained and are included in interpretation. One or more dose reduction techniques were used on this CT: automated exposure control, adjustment of the mAs and/or kVp according to patient size, and iterative reconstruction techniques. The specific techniques used on this CT exam have been documented in the patient's electronic medical record. Digital Imaging and Communications in Medicine (DICOM) format image data are available to nonaffiliated external healthcare facilities or entities on a secure, media free, reciprocally searchable basis with patient authorization for at least a 12-month period after this study. _______________ FINDINGS: BRAIN AND POSTERIOR FOSSA: The sulci, folia, ventricles and basal cisterns are within normal limits for the patient?s age. There is no intracranial hemorrhage, mass effect, or midline shift. Basal ganglia calcifications. EXTRA-AXIAL SPACES AND MENINGES: No acute extra-axial fluid collection. CALVARIUM: Intact. SINUSES: Clear. OTHER: None. _______________     1. No acute intracranial abnormality demonstrated. CRITICAL RESULT:  CODE S stroke result called to Dr. Jose Arizmendi in the emergency room prior to dictation at 10:17 AM, 10/29/2021    CTA HEAD NECK W CONT    Result Date: 10/29/2021  EXAM:  CT angiogram of the head and neck with intravenous contrast. CLINICAL INDICATION/HISTORY:Slurred speech, CODE S. COMPARISON: CT head same day. TECHNIQUE:  Following IV administration of nonionic contrast, helical CTA head and neck performed.  Three-dimensional, maximum intensity projection, and curved planar reformations were post-processed at a dedicated outside facility ([employer]). Stenoses are reported with reference to the downstream lumen (\"NASCET\"). One or more dose reduction techniques were used on this CT: automated exposure control, adjustment of the mAs and/or kVp according to patient's size, and iterative reconstruction techniques. The specific techniques utilized on this CT exam have been documented in the patient's electronic medical record. Digital imaging and communications and medicine (DICOM) format image data are available to nonaffiliated external healthcare facilities or entities on a secure, media free, reciprocally searchable basis with patient authorization for at least a 12 month period after this study. _______________ FINDINGS: NECK CTA:      ARTERIAL BOLUS QUALITY:  Satisfactory. AORTIC ARCH: Normal branching pattern. No proximal great vessel stenosis. RIGHT CAROTID ARTERY:  Normal-appearing common carotid and internal carotid arteries. LEFT CAROTID ARTERY:  Normal-appearing common carotid and internal carotid arteries. VERTEBRAL ARTERIES: The vertebral arteries are codominant. RIGHT VERTEBRAL ARTERY:  No stenosis or other vascular abnormality. LEFT VERTEBRAL ARTERY:  No stenosis or other vascular abnormality. Limited evaluation left vertebral artery origin due to artifact. LUNG APICES: No mass. NECK SOFT TISSUES: No significant neck soft tissue abnormality. OSSEOUS: Unremarkable. BRAIN CTA:      CAROTID SIPHON AND SUPRACLINOID ICA: No significant stenosis. M1 SEGMENT MCA: No significant stenosis or aneurysm. PROXIMAL M2 SEGMENT MCA: No significant stenosis or aneurysm. A1 SEGMENT, ANTERIOR COMMUNICATING ARTERY AND PROXIMAL A2 SEGMENTS:           No significant stenosis or aneurysm.       VERTEBRAL BASILAR SYSTEM:Patent bilateral posterior communicating arteries right larger than left, normal-appearing distal vertebral and basilar arteries. PCA:No significant stenosis or aneurysm. DISTAL ANTERIOR CEREBRAL ARTERY:No significant stenosis or aneurysm. DISTAL MCA M2/M3 SEGMENT:No significant stenosis or aneurysm. DURAL VENOUS SINUSES: Unremarkable. BRAIN PARENCHYMA ON SOURCE DATA: No significant brain parenchymal abnormality. _______________     1. No hemodynamically significant cervical vascular stenosis. 2. Unremarkable brain CTA, with no evidence of intracranial large vessel occlusion. Initial findings discussed with Dr. Rima Cardoso at 11:24 AM.    ASSESSMENT/IMPRESSION:  39-year-old female presents with sudden onset of left-sided paresthesia. Symptoms have greatly subsided after a few hours. CT head, CTA head and neck, and brain MRI are unremarkable. 1. Paresthesia: Greatly improved. It is secondary to stress versus complex migraine. RECOMMENDATIONS:  1. Fioricet as needed for headache. 2. Patient can be discharged from neurology standpoint  3. Follow-up in clinic after 2 months. I will follow the patient.  Please do not hesitate to return with any questions.    ------------------------------------  Noah Luna MD  10/29/2021  6:44 PM

## 2021-10-29 NOTE — ED NOTES
TRANSFER - OUT REPORT:    Verbal report given to Elias (name) on Josh Payne Normal  being transferred to Tele (unit) for routine progression of care       Report consisted of patients Situation, Background, Assessment and   Recommendations(SBAR). Information from the following report(s) SBAR, ED Summary, Recent Results and Cardiac Rhythm Normal sinus rhythm with sinus arrhythmia  was reviewed with the receiving nurse. Lines:   Peripheral IV 10/29/21 Left Antecubital (Active)   Site Assessment Clean, dry, & intact 10/29/21 0950   Phlebitis Assessment 0 10/29/21 0950   Infiltration Assessment 0 10/29/21 0950   Dressing Status Clean, dry, & intact 10/29/21 0950   Dressing Type Transparent 10/29/21 0950   Hub Color/Line Status Patent; Flushed 10/29/21 0950   Action Taken Blood drawn 10/29/21 0950        Opportunity for questions and clarification was provided.       Patient transported with:   Registered Nurse

## 2021-10-29 NOTE — ROUTINE PROCESS
Bedside and Verbal shift change report given to Mahesh Miller RN (oncoming nurse) by Penny Velasquez RN (offgoing nurse). Report included the following information SBAR and Kardex.

## 2021-10-29 NOTE — PROGRESS NOTES
OCCUPATIONAL THERAPY EVALUATION/DISCHARGE    Patient: Ada Navarro Normal (28 y.o. female)  Date: 10/29/2021  Primary Diagnosis: Slurred speech [R47.81]        Precautions:   Fall  PLOF: independent with ADLs and transfers e    ASSESSMENT AND RECOMMENDATIONS:  Based on the objective data described below, the patient presents to be independent with ADLs and transfers following above mentioned medical diagnosis. Pt participate with LB dressing and ambulate with PT. No LOB noticed, no vision impairments and no significant weakness noted throughout the session. Pt was left seated EOB at the end of session in NAD. Skilled occupational therapy is not indicated at this time. Discharge Recommendations: None  Further Equipment Recommendations for Discharge: N/A      SUBJECTIVE:   Patient stated  I am doing alright now.     OBJECTIVE DATA SUMMARY:   No past medical history on file. No past surgical history on file. Barriers to Learning/Limitations: None  Compensate with: visual, verbal, tactile, kinesthetic cues/model    Home Situation:   Home Situation  Home Environment: Private residence  # Steps to Enter: 4  Rails to Enter: Yes  Hand Rails : Bilateral  One/Two Story Residence: One story  Living Alone: No  Support Systems: Spouse/Significant Other, Child(dariel), Other Family Member(s)  Patient Expects to be Discharged to[de-identified] House  Current DME Used/Available at Home: None  Tub or Shower Type: Tub/Shower combination  []     Right hand dominant   []     Left hand dominant    Cognitive/Behavioral Status:  Neurologic State: Alert  Orientation Level: Oriented X4  Cognition: Appropriate for age attention/concentration; Follows commands  Safety/Judgement: Fall prevention    Skin: intact  Edema: none    Vision/Perceptual:    Tracking: Able to track stimulus in all quadrants w/o difficulty    Acuity: Within Defined Limits;Able to read clock/calendar on wall without difficulty    Coordination: BUE  Coordination: Within functional limits  Fine Motor Skills-Upper: Left Intact; Right Intact    Gross Motor Skills-Upper: Left Intact; Right Intact  Balance:  Sitting: Intact  Standing: Intact; Without support  Strength: BUE  Strength: Within functional limits  Tone & Sensation: BUE  Tone: Normal  Sensation: Intact  Range of Motion: BUE  AROM: Within functional limits  Functional Mobility and Transfers for ADLs:  Bed Mobility:  Supine to Sit: Independent  Scooting: Independent  Transfers:  Sit to Stand: Independent  Stand to Sit: Independent  ADL Assessment:  Feeding: Independent    Oral Facial Hygiene/Grooming: Independent    Upper Body Dressing: Independent    Lower Body Dressing: Independent    Toileting: Independent  ADL Intervention:  Lower Body Dressing Assistance  Dressing Assistance: Independent  Socks: Independent  Leg Crossed Method Used: Yes  Position Performed: Seated edge of bed  Cues: Parish Duffy    Cognitive Retraining  Safety/Judgement: Fall prevention    Pain:  Pain level pre-treatment: 0/10   Pain level post-treatment: 0/10   Pain Intervention(s): Medication (see MAR); Rest, Ice, Repositioning   Response to intervention: Nurse notified, See doc flow    Activity Tolerance:   Good     Please refer to the flowsheet for vital signs taken during this treatment. After treatment:   [x]  Patient left in no apparent distress sitting up at EOB  []  Patient left in no apparent distress in bed  [x]  Call bell left within reach  []  Nursing notified  []  Caregiver present  []  Bed alarm activated    COMMUNICATION/EDUCATION:   [x]      Role of Occupational Therapy in the acute care setting  [x]      Home safety education was provided and the patient/caregiver indicated understanding. [x]      Patient/family have participated as able and agree with findings and recommendations. []      Patient is unable to participate in plan of care at this time.     Thank you for this referral.  Nestor Camacho OTR/L  Time Calculation: 15 mins      Eval Complexity: History: LOW Complexity : Brief history review ; Examination: LOW Complexity : 1-3 performance deficits relating to physical, cognitive , or psychosocial skils that result in activity limitations and / or participation restrictions ;    Decision Making:LOW Complexity : No comorbidities that affect functional and no verbal or physical assistance needed to complete eval tasks

## 2021-10-30 VITALS
HEART RATE: 87 BPM | TEMPERATURE: 98.7 F | DIASTOLIC BLOOD PRESSURE: 89 MMHG | SYSTOLIC BLOOD PRESSURE: 130 MMHG | OXYGEN SATURATION: 100 % | RESPIRATION RATE: 15 BRPM

## 2021-10-30 LAB
CHOLEST SERPL-MCNC: 138 MG/DL
ERYTHROCYTE [DISTWIDTH] IN BLOOD BY AUTOMATED COUNT: 14.5 % (ref 11.6–14.5)
EST. AVERAGE GLUCOSE BLD GHB EST-MCNC: 120 MG/DL
HBA1C MFR BLD: 5.8 % (ref 4.2–5.6)
HCT VFR BLD AUTO: 33.3 % (ref 35–45)
HDLC SERPL-MCNC: 33 MG/DL (ref 40–60)
HDLC SERPL: 4.2 {RATIO} (ref 0–5)
HGB BLD-MCNC: 10.4 G/DL (ref 12–16)
LDLC SERPL CALC-MCNC: 95.8 MG/DL (ref 0–100)
LIPID PROFILE,FLP: ABNORMAL
MCH RBC QN AUTO: 27.5 PG (ref 24–34)
MCHC RBC AUTO-ENTMCNC: 31.2 G/DL (ref 31–37)
MCV RBC AUTO: 88.1 FL (ref 78–100)
PLATELET # BLD AUTO: 274 K/UL (ref 135–420)
PMV BLD AUTO: 10.2 FL (ref 9.2–11.8)
RBC # BLD AUTO: 3.78 M/UL (ref 4.2–5.3)
TRIGL SERPL-MCNC: 46 MG/DL (ref ?–150)
VLDLC SERPL CALC-MCNC: 9.2 MG/DL
WBC # BLD AUTO: 4 K/UL (ref 4.6–13.2)

## 2021-10-30 PROCEDURE — 85027 COMPLETE CBC AUTOMATED: CPT

## 2021-10-30 PROCEDURE — 74011250637 HC RX REV CODE- 250/637: Performed by: EMERGENCY MEDICINE

## 2021-10-30 PROCEDURE — 74011250637 HC RX REV CODE- 250/637: Performed by: PSYCHIATRY & NEUROLOGY

## 2021-10-30 PROCEDURE — 99218 HC RM OBSERVATION: CPT

## 2021-10-30 PROCEDURE — 83036 HEMOGLOBIN GLYCOSYLATED A1C: CPT

## 2021-10-30 PROCEDURE — 80061 LIPID PANEL: CPT

## 2021-10-30 PROCEDURE — 36415 COLL VENOUS BLD VENIPUNCTURE: CPT

## 2021-10-30 RX ORDER — BUTALBITAL, ACETAMINOPHEN AND CAFFEINE 50; 325; 40 MG/1; MG/1; MG/1
1 TABLET ORAL
Qty: 10 TABLET | Refills: 0 | Status: SHIPPED | OUTPATIENT
Start: 2021-10-30

## 2021-10-30 RX ADMIN — BUTALBITAL, ACETAMINOPHEN, AND CAFFEINE 1 TABLET: 50; 325; 40 TABLET ORAL at 13:32

## 2021-10-30 RX ADMIN — ASPIRIN 81 MG: 81 TABLET, CHEWABLE ORAL at 09:52

## 2021-10-30 NOTE — PROGRESS NOTES
Reviewed patient's discharge paperwork and removed IV. Patient taken down to discharge in wheelchair.

## 2021-10-30 NOTE — PROGRESS NOTES
Hospitalist Progress Note    Patient: Bala Toro Normal MRN: [id number]  CSN: [id number]    YOB: 1990  Age: 27 y.o. Sex: female    DOA: 10/29/2021 LOS:  LOS: 0 days          Chief Complaint:    Left sided numbness     Assessment/Plan   76-year-old female with a history of obesity, metabolic syndrome who presented with change in speech, weakness and left-sided numbness and tingling. Admitted for evaluation for possible stroke or TIA. All symptoms have resolved    CT head, CTA head neck and brain MRI all unremarkable. Evaluated by neurologist, appreciate Dr. Laurie Floyd expertise, symptoms thought to be secondary to stress versus complex migraine    Discussed life stressors with patient in more detail and determined that she has a high level of stress with her job changes and going to school full-time. Patient will be discharged home today can follow-up with neurology in 2 months     Advised patient with her obesity and metabolic syndrome and intermittently elevated blood pressure that she needs to follow-up with her PCP and start some aggressive lifestyle changes including diet and weight management, patient expresses understanding and a willingness to begin. Disposition : DC home today  Patient Active Problem List   Diagnosis Code    Normal spontaneous vaginal delivery O80    Laceration of vaginal wall or sulcus without perineal laceration during delivery O71.4    Slurred speech R47.81    Left sided numbness J83.6    Metabolic syndrome U91.01       Subjective:    Feeling much better. No symptoms of numbness or tingling or speech disturbances at present.      Review of systems:    Constitutional: denies fevers, chills, myalgias  Respiratory: denies SOB, cough  Cardiovascular: denies chest pain, palpitations  Gastrointestinal: denies nausea, vomiting, diarrhea      Vital signs/Intake and Output:  Visit Vitals  /88   Pulse 83   Temp 98.3 °F (36.8 °C)   Resp 14   SpO2 100% Current Shift:  No intake/output data recorded. Last three shifts:  10/28 1901 - 10/30 0700  In: 240 [P.O.:240]  Out: 200 [Urine:200]    Exam:    General: Well developed, alert, NAD, OX3  Head/Neck: NCAT, supple, No masses, No lymphadenopathy  CVS:Regular rate and rhythm, no M/R/G, S1/S2 heard, no thrill  Lungs:Clear to auscultation bilaterally, no wheezes, rhonchi, or rales  Abdomen: Soft, Nontender, No distention, Normal Bowel sounds, No hepatomegaly  Extremities: No C/C/E, pulses palpable 2+  Skin:normal texture and turgor, no rashes, no lesions  Neuro:grossly normal , follows commands  Psych:appropriate                Labs: Results:       Chemistry Recent Labs     10/29/21  1010   *      K 3.6      CO2 25   BUN 9   CREA 0.72   CA 9.0   AGAP 4   BUCR 13   AP 66   TP 8.5*   ALB 3.3*   GLOB 5.2*   AGRAT 0.6*      CBC w/Diff Recent Labs     10/30/21  0159 10/29/21  1010   WBC 4.0* 4.2*   RBC 3.78* 4.32   HGB 10.4* 12.2   HCT 33.3* 38.0    323   GRANS  --  63   LYMPH  --  29   EOS  --  1      Cardiac Enzymes No results for input(s): CPK, CKND1, ANNABELLE in the last 72 hours. No lab exists for component: CKRMB, TROIP   Coagulation Recent Labs     10/29/21  1020   PTP 13.4   INR 1.1   APTT 28.1       Lipid Panel No results found for: CHOL, CHOLPOCT, CHOLX, CHLST, CHOLV, 071238, HDL, HDLP, LDL, LDLC, DLDLP, 158962, VLDLC, VLDL, TGLX, TRIGL, TRIGP, TGLPOCT, CHHD, CHHDX   BNP No results for input(s): BNPP in the last 72 hours.    Liver Enzymes Recent Labs     10/29/21  1010   TP 8.5*   ALB 3.3*   AP 66      Thyroid Studies No results found for: T4, T3U, TSH, TSHEXT     Procedures/imaging: see electronic medical records for all procedures/Xrays and details which were not copied into this note but were reviewed prior to creation of Mary Anne Dela Cruz MD

## 2021-10-30 NOTE — PROGRESS NOTES
[address] October 30, 2021       RE: Justen Stubbs      To Whom It May Concern,    This is to certify that Justen Stubbs was admitted to [facility] October 29, 2021 and discharged home on October 30, 2021. Please feel free to contact my office if you have any questions or concerns. Thank you for your assistance in this matter.       Sincerely,  Carissa Dimas MD

## 2021-10-30 NOTE — DISCHARGE SUMMARY
Discharge Summary    Patient: Carito Stubbs MRN: [id number]  CSN: [id number]    YOB: 1990  Age: 27 y.o. Sex: female    DOA: 10/29/2021 LOS:  LOS: 0 days   Discharge Date:      Primary Care Provider:  SUSY Willett    Admission Diagnoses: Slurred speech [R47.81]    Discharge Diagnoses:    Problem List as of 10/30/2021 Date Reviewed: 9/21/2017        Codes Class Noted - Resolved    Slurred speech ICD-10-CM: R47.81  ICD-9-CM: 784.59  10/29/2021 - Present        * (Principal) Left sided numbness ICD-10-CM: R20.0  ICD-9-CM: 782.0  10/29/2021 - Present        Metabolic syndrome TGE-[id number]: E88.81  ICD-9-CM: 277.7  10/29/2021 - Present        Normal spontaneous vaginal delivery ICD-10-CM: O80  ICD-9-CM: 931  9/21/2017 - Present        Laceration of vaginal wall or sulcus without perineal laceration during delivery ICD-10-CM: O71.4  ICD-9-CM: 665.40  9/21/2017 - Present        RESOLVED: IUP (intrauterine pregnancy), incidental ICD-10-CM: Z33.1  ICD-9-CM: V22.2  9/21/2017 - 9/21/2017        RESOLVED: 39 weeks gestation of pregnancy ICD-10-CM: Z3A.39  ICD-9-CM: V22.2  9/21/2017 - 9/21/2017              Discharge Medications:     Current Discharge Medication List      START taking these medications    Details   butalbital-acetaminophen-caffeine (FIORICET, ESGIC) -40 mg per tablet Take 1 Tablet by mouth every six (6) hours as needed for Headache.   Qty: 10 Tablet, Refills: 0  Start date: 10/30/2021         STOP taking these medications       ibuprofen (MOTRIN) 800 mg tablet Comments:   Reason for Stopping:         oxyCODONE-acetaminophen (PERCOCET) 5-325 mg per tablet Comments:   Reason for Stopping:         [id number] loyd-folic-dha (PRENATAL DHA+COMPLETE PRENATAL) -300 mg-mcg-mg cmpk Comments:   Reason for Stopping:               Discharge Condition: improved    Procedures : None     Consults: Neurology       PHYSICAL EXAM   Visit Vitals  /89   Pulse 87   Temp 98.7 °F (37.1 °C)   Resp 15   SpO2 100%     General: Awake, cooperative, no acute distress    HEENT: NC, Atraumatic. PERRLA, EOMI. Anicteric sclerae. Lungs:  CTA Bilaterally. No Wheezing/Rhonchi/Rales. Heart:  Regular  rhythm,  No murmur, No Rubs, No Gallops  Abdomen: Soft, Non distended, Non tender. +Bowel sounds,   Extremities: No c/c/e  Psych:   Not anxious or agitated. Neurologic:  No acute neurological deficits. Admission HPI : Mariela Stubbs is a 27 y.o. female with h/o of obesity, metabolic syndrome/? early diabetes who presents to the emergency department C/O is weakness, changes in speech and left-sided numbness and tingling. In the ER it was reported that she arrived at work approximately 45 minutes before presenting to the ER and was noted to be having difficulty completing her sentences with general lethargy by her supervisor and was brought in for stroke evaluation. In the emergency department she was found to have no focal deficits and her exam per the ED physician was inconsistent with her reported symptoms. There was reported that she did not have word salad or inability to speak she was a slow to respond and but did so appropriately with some moderate encouragement. She is outside the window to receive TPA. However, stroke alert was called and teleneurologist suggested admission and stroke evaluation. CT head and CTA neck unremarkable. Patient reported that she has never had these symptoms before. Hospital Course : 34-year-old female with a history of obesity, metabolic syndrome who presented with change in speech, weakness and left-sided numbness and tingling.  Admitted for evaluation for possible stroke or TIA.       All symptoms have resolved     CT head, CTA head neck and brain MRI all unremarkable.     Evaluated by neurologist, appreciate Dr. Houston Nissen expertise, symptoms thought to be secondary to stress versus complex migraine     Discussed life stressors with patient in more detail and determined that she has a high level of stress with her job changes and going to school full-time.      Patient will be discharged home today can follow-up with neurology in 2 months      Advised patient with her obesity and metabolic syndrome and intermittently elevated blood pressure that she needs to follow-up with her PCP and start some aggressive lifestyle changes including diet and weight management, patient expresses understanding and a willingness to begin. Activity: as tolerated    Diet: Low salt, high fiber, carb controlled diet    Follow-up: with PCP in one week and neurology in 2 months    Disposition: home     Minutes spent on discharge: 35 minutes       Labs: Results:       Chemistry Recent Labs     10/29/21  1010   *      K 3.6      CO2 25   BUN 9   CREA 0.72   CA 9.0   AGAP 4   BUCR 13   AP 66   TP 8.5*   ALB 3.3*   GLOB 5.2*   AGRAT 0.6*      CBC w/Diff Recent Labs     10/30/21  0159 10/29/21  1010   WBC 4.0* 4.2*   RBC 3.78* 4.32   HGB 10.4* 12.2   HCT 33.3* 38.0    323   GRANS  --  63   LYMPH  --  29   EOS  --  1      Cardiac Enzymes No results for input(s): CPK, CKND1, ANNABELLE in the last 72 hours. No lab exists for component: CKRMB, TROIP   Coagulation Recent Labs     10/29/21  1020   PTP 13.4   INR 1.1   APTT 28.1       Lipid Panel Lab Results   Component Value Date/Time    Cholesterol, total 138 10/30/2021 01:59 AM    HDL Cholesterol 33 (L) 10/30/2021 01:59 AM    LDL, calculated 95.8 10/30/2021 01:59 AM    VLDL, calculated 9.2 10/30/2021 01:59 AM    Triglyceride 46 10/30/2021 01:59 AM    CHOL/HDL Ratio 4.2 10/30/2021 01:59 AM      BNP No results for input(s): BNPP in the last 72 hours.    Liver Enzymes Recent Labs     10/29/21  1010   TP 8.5*   ALB 3.3*   AP 66      Thyroid Studies No results found for: T4, T3U, TSH, TSHEXT         Significant Diagnostic Studies: MRI BRAIN WO CONT    Result Date: 10/29/2021  EXAM: MRI brain without gadolinium CLINICAL INDICATION/HISTORY: Slurred speech with left-sided numbness   > Additional: None. COMPARISON: CT head and CTA head and neck same day   > Reference Exam: None. TECHNIQUE: Sagittal T1, axial T1, T2, FLAIR, T2 gradient, diffusion and coronal T1 and T2 sequences obtained of the brain. _______________ FINDINGS: Diffusion:  There are no areas of restricted diffusion, no evidence of an acute infarction. Brain parenchyma:  Several minimal bilateral frontal white matter T2 hyperintense foci with no other cortical or white matter signal abnormality. No evidence of mass hemorrhage edema or mass effect. Ventricles and sulci:  Normal.  No significant atrophy given age. Extra-axial:  No extra-axial fluid collection or mass is noted. Brain vasculature:  No vascular abnormality is appreciated on this routine brain MR examination. Craniocervical junction:  Normal. Skull base, extracranial and calvarium:  Mild residual adenoidal hypertrophy. Orbits sinuses IACs and mastoids, sella and skull unremarkable. _______________     1. No evidence of acute infarct or other acute intracranial finding. 2. Minimal frontal white matter signal changes very nonspecific, commonly seen in patients of all ages. CT HEAD WO CONT    Result Date: 10/29/2021  EXAM: CT head INDICATION: Denies weakness and slurred speech. COMPARISON: None. TECHNIQUE: Axial CT imaging of the head was performed without intravenous contrast. Standard multiplanar coronal and sagittal reformatted images were obtained and are included in interpretation. One or more dose reduction techniques were used on this CT: automated exposure control, adjustment of the mAs and/or kVp according to patient size, and iterative reconstruction techniques. The specific techniques used on this CT exam have been documented in the patient's electronic medical record.   Digital Imaging and Communications in Medicine (DICOM) format image data are available to nonaffiliated external healthcare facilities or entities on a secure, media free, reciprocally searchable basis with patient authorization for at least a 12-month period after this study. _______________ FINDINGS: BRAIN AND POSTERIOR FOSSA: The sulci, folia, ventricles and basal cisterns are within normal limits for the patient?s age. There is no intracranial hemorrhage, mass effect, or midline shift. Basal ganglia calcifications. EXTRA-AXIAL SPACES AND MENINGES: No acute extra-axial fluid collection. CALVARIUM: Intact. SINUSES: Clear. OTHER: None. _______________     1. No acute intracranial abnormality demonstrated. CRITICAL RESULT:  CODE S stroke result called to Dr. Jose Arizmendi in the emergency room prior to dictation at 10:17 AM, 10/29/2021    CTA HEAD NECK W CONT    Result Date: 10/29/2021  EXAM:  CT angiogram of the head and neck with intravenous contrast. CLINICAL INDICATION/HISTORY:Slurred speech, CODE S. COMPARISON: CT head same day. TECHNIQUE:  Following IV administration of nonionic contrast, helical CTA head and neck performed. Three-dimensional, maximum intensity projection, and curved planar reformations were post-processed at a dedicated outside facility ([facility]). Stenoses are reported with reference to the downstream lumen (\"NASCET\"). One or more dose reduction techniques were used on this CT: automated exposure control, adjustment of the mAs and/or kVp according to patient's size, and iterative reconstruction techniques. The specific techniques utilized on this CT exam have been documented in the patient's electronic medical record. Digital imaging and communications and medicine (DICOM) format image data are available to nonaffiliated external healthcare facilities or entities on a secure, media free, reciprocally searchable basis with patient authorization for at least a 12 month period after this study. _______________ FINDINGS: NECK CTA:      ARTERIAL BOLUS QUALITY:  Satisfactory.       AORTIC ARCH: Normal branching pattern. No proximal great vessel stenosis. RIGHT CAROTID ARTERY:  Normal-appearing common carotid and internal carotid arteries. LEFT CAROTID ARTERY:  Normal-appearing common carotid and internal carotid arteries. VERTEBRAL ARTERIES: The vertebral arteries are codominant. RIGHT VERTEBRAL ARTERY:  No stenosis or other vascular abnormality. LEFT VERTEBRAL ARTERY:  No stenosis or other vascular abnormality. Limited evaluation left vertebral artery origin due to artifact. LUNG APICES: No mass. NECK SOFT TISSUES: No significant neck soft tissue abnormality. OSSEOUS: Unremarkable. BRAIN CTA:      CAROTID SIPHON AND SUPRACLINOID ICA: No significant stenosis. M1 SEGMENT MCA: No significant stenosis or aneurysm. PROXIMAL M2 SEGMENT MCA: No significant stenosis or aneurysm. A1 SEGMENT, ANTERIOR COMMUNICATING ARTERY AND PROXIMAL A2 SEGMENTS:           No significant stenosis or aneurysm. VERTEBRAL BASILAR SYSTEM:Patent bilateral posterior communicating arteries right larger than left, normal-appearing distal vertebral and basilar arteries. PCA:No significant stenosis or aneurysm. DISTAL ANTERIOR CEREBRAL ARTERY:No significant stenosis or aneurysm. DISTAL MCA M2/M3 SEGMENT:No significant stenosis or aneurysm. DURAL VENOUS SINUSES: Unremarkable. BRAIN PARENCHYMA ON SOURCE DATA: No significant brain parenchymal abnormality. _______________     1. No hemodynamically significant cervical vascular stenosis. 2. Unremarkable brain CTA, with no evidence of intracranial large vessel occlusion. Initial findings discussed with Dr. Omar Shrestha at 11:24 AM.        No results found for this or any previous visit.         CC: Scooby Antonio

## 2021-10-30 NOTE — PROGRESS NOTES
Oral and Written notification given to patient and/or caregiver informing them that they are currently an Outpatient receiving care in our facility. Outpatient services include Observation Services. Chat reviewed; per H&P, patient is a \"30 y.o. female with h/o of obesity, metabolic syndrome/? early diabetes who presents to the emergency department C/O is weakness, changes in speech and left-sided numbness and tingling. In the ER it was reported that she arrived at work approximately 45 minutes before presenting to the ER and was noted to be having difficulty completing her sentences with general lethargy by her supervisor and was brought in for stroke evaluation. \"    PT/OT have evaluated and cleared patient - no services recommended. 26 - care manager met with patient sitting up in chair, dressed and spouse appears asleep face down in her bed. Per patient has been discharged,  No followup needed. Care Management Interventions  PCP Verified by CM:  Yes  Mode of Transport at Discharge: Self  Transition of Care Consult (CM Consult): Discharge Planning  Support Systems: Spouse/Significant Other  Confirm Follow Up Transport: Family  The Plan for Transition of Care is Related to the Following Treatment Goals : left sided numbness  The Patient and/or Patient Representative was Provided with a Choice of Provider and Agrees with the Discharge Plan?: Yes  Name of the Patient Representative Who was Provided with a Choice of Provider and Agrees with the Discharge Plan: Arlen Stubbs, patient  Discharge Location  Discharge Placement: Home with family assistance

## 2021-10-30 NOTE — PROGRESS NOTES
SLP Note -    SLP evaluation orders received. Upon chart review and discussion with pt, no skilled SLP evaluation indicated at this time. Pt tolerating regular diet with thin liquids with no reported distress. Pt did report \"slowed speech\", though endorses resolution of sx at this time. Speech and voice within functional limits. Educated with regard to role of SLP, s/sx aspiration and to alert MD/RN if symptoms arise. Pt verbalized understanding. Will sign off.        Thank you for this referral.    BOBBY Kaufman.S., [id number] [facility]  Speech-Language Pathologist